# Patient Record
Sex: MALE | Employment: UNEMPLOYED | URBAN - METROPOLITAN AREA
[De-identification: names, ages, dates, MRNs, and addresses within clinical notes are randomized per-mention and may not be internally consistent; named-entity substitution may affect disease eponyms.]

---

## 2018-08-07 ENCOUNTER — OFFICE VISIT (OUTPATIENT)
Dept: ORTHOPEDIC SURGERY | Age: 68
End: 2018-08-07

## 2018-08-07 VITALS
WEIGHT: 178 LBS | DIASTOLIC BLOOD PRESSURE: 70 MMHG | HEART RATE: 74 BPM | BODY MASS INDEX: 26.98 KG/M2 | OXYGEN SATURATION: 98 % | SYSTOLIC BLOOD PRESSURE: 123 MMHG | HEIGHT: 68 IN

## 2018-08-07 DIAGNOSIS — S92.355A CLOSED NONDISPLACED FRACTURE OF FIFTH METATARSAL BONE OF LEFT FOOT, INITIAL ENCOUNTER: Primary | ICD-10-CM

## 2018-08-07 DIAGNOSIS — M25.572 ACUTE LEFT ANKLE PAIN: ICD-10-CM

## 2018-08-07 DIAGNOSIS — S82.832A OTHER CLOSED FRACTURE OF DISTAL END OF LEFT FIBULA, INITIAL ENCOUNTER: ICD-10-CM

## 2018-08-07 RX ORDER — ACETAMINOPHEN 325 MG/1
500 TABLET ORAL
COMMUNITY
End: 2021-01-27

## 2018-08-07 RX ORDER — SIMVASTATIN 40 MG/1
TABLET, FILM COATED ORAL
COMMUNITY
End: 2021-01-27

## 2018-08-07 RX ORDER — VENLAFAXINE HYDROCHLORIDE 150 MG/1
TABLET, EXTENDED RELEASE ORAL
COMMUNITY
End: 2021-01-27

## 2018-08-07 RX ORDER — ASPIRIN 325 MG
TABLET, DELAYED RELEASE (ENTERIC COATED) ORAL
COMMUNITY
End: 2021-01-27

## 2018-08-07 RX ORDER — CLONAZEPAM 1 MG/1
TABLET ORAL 2 TIMES DAILY
COMMUNITY
End: 2021-01-27

## 2018-08-07 NOTE — PROGRESS NOTES
AMBULATORY PROGRESS NOTE      Patient: Sunni Worthington             MRN: [id number]     SSN: [id number] Body mass index is 27.06 kg/(m^2). YOB: 1950     AGE: 79 y.o. EX: male    PCP: No primary care provider on file. IMPRESSION/DIAGNOSIS AND TREATMENT PLAN     DIAGNOSES  1. Closed nondisplaced fracture of fifth metatarsal bone of left foot, initial encounter    2. Other closed fracture of distal end of left fibula, initial encounter    3. Acute left ankle pain        Orders Placed This Encounter    [08032] Ankle Min 3V    MT CLOSED TX DIST FIBULA FX    MT CLOSED [place] METATARSAL FX      Sunni Worthington understands his diagnoses and the proposed plan. Plan:    1) Continue using Left Tall CAM walker boot as directed. 2) Dressings provided for left foot abrasion. RTO - 2 weeks / PLEASE OBTAIN X-RAYS OF: left ankle and foot 3 VIEWS    HPI AND EXAMINATION     Renny Cummings IS A 79 y.o. male who presents to my outpatient office complaining of foot pain. Patient reports that he stepped into a curb sideways and fell. Patient has been seen in the past by another provider for a left distal fibula fracture and a left fifth metatarsal base avulsion fracture. He arrives to the office using a left Tall CAM walker boot which he notes it fits well. He has an abrasion along the dorsal aspect of his midfoot that came up yesterday after having the boot placed on too tightly. It is to be noted that he resides at a local facility. He has a history of schizophrenia. He is accompanied with a  or care coordinator. He has a past medical history of schizophrenia, bipolar disorder, major depressive disorder, anxiety, bronchitis, COPD. He sustained a left 5th metatarsal base fracture as well as a nondisplaced left fibula fracture. He is homeless but he is currently living in a facility. Date of injury was roughly on 07/18/2018.   He was walking, tripped on a curb, he states, injuring the ankle; he misstepped. He was seen at [facility] in [place], [place], where he was diagnosed with the above-mentioned fractures. He does have a history of a ALEXIA, tardive dyskinesia, dyslipidemia as additional medical conditions. Additional diagnostic studies have included a CT of the head that showed no acute intracranial hemorrhage, no CT evidence of acute infarction. There is some encephalomalacia. While at the facility he was seen by Neurology, dietary, and psychiatric evaluation. He does apparently use nicotine gum, nicotine patch, clonazepam, Combivent, and simvastatin medications. He arrives here today in a tall CAM walker boot. He admits to having no pain in the ankle. It is to be noted that he had ankle stability stress testing, he had gravity stress testing, in which the mortise alignment was maintained on gravity stress testing of his ankle, all at the [facility], as this study was done on 07/19/2018 of that left ankle. So while at the facility, he had ankle films, foot films, which showed a fibula fracture, as well is a 5th metatarsal base fracture, avulsion-type fracture essentially nondisplaced. He also had x-rays of the left knee, 4 views, showed no effusion. There was mild lateral patellar subluxation. No evidence of acute fracture. 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 Patient is here with his Medical Administrator from the facility he resides in. Left ANKLE and FOOT       Gait: slow  Tenderness: Not tenderness to palpation at this time. Cutaneous: No rashes, skin patches, wounds, or abrasions to the lower legs           Warm and Normal color. No regions of expressible drainage. Medial Border of Tibia Region: absent           Skin color, texture, turgor normal. Normal.           Abrasion along the dorsal aspect of his midfoot.   Joint Motion:  Not tested or conducted due to tenderness  Neurologic Exam: Neuro: Motor: normal 5/5 strength in all tested muscle groups and Sensory : no sensory deficits noted. Contractures: Gastrocnemius or Achilles Contractures absent  Joint / Tendon Stability: No Ankle or Subtalar instability or joint laxity. No peroneal sublux ability or dislocation  Alignment:  Normal Foot Alignment  Vascular: Normal Pulses/ NL Capillary refill, No evidence of DVT seen on physical exam.   No calf swelling, no tenderness to calf muscles. Lymphatic:  No Evidence of Lymphedema. CHART REVIEW     Past Medical History:   Diagnosis Date    Anxiety     Bipolar 1 disorder ([facility] [address]     Bronchitis     Chronic obstructive pulmonary disease (HCC)     Hyperlipidemia     Major depression     Metabolic encephalopathy     Schizophrenia (HCC)      Current Outpatient Prescriptions   Medication Sig    acetaminophen (TYLENOL) 325 mg tablet Take  by mouth every four (4) hours as needed for Pain.  cholecalciferol (VITAMIN D3) 50,000 unit capsule Take  by mouth every seven (7) days.  clonazePAM (KLONOPIN) 1 mg tablet Take  by mouth two (2) times a day.  ipratropium-albuterol (COMBIVENT RESPIMAT)  mcg/actuation inhaler Take 1 Puff by inhalation every six (6) hours.  simvastatin (ZOCOR) 40 mg tablet Take  by mouth nightly.  Venlafaxine 150 mg tr24 Take  by mouth. No current facility-administered medications for this visit. Not on File  No past surgical history on file. Social History     Occupational History    Not on file. Social History Main Topics    Smoking status: Current Every Day Smoker    Smokeless tobacco: Never Used    Alcohol use No    Drug use: No    Sexual activity: Not on file     No family history on file. REVIEW OF SYSTEMS : 8/7/2018  ALL BELOW ARE Negative except : SEE HPI       Constitutional: Negative for fever, chills and weight loss. Neg Weigh Loss  Cardiovascular: Negative for chest pain, claudication and leg swelling.  SOB, OTT Gastrointestinal: Negative for  pain, N/V/D/C, Blood in stool or urine,dysuria, hematuria,        Incontinence, pelvic pain  Musculoskeletal: see HPI. Neurological: Negative for dizziness and weakness. Negative for headaches,Visual Changes, Confusion, Seizures,   Psychiatric/Behavioral: Negative for depression, memory loss and substance abuse. Extremities:  Negative for  hair changes, rash or skin lesion changes. Hematologic: Negative for Bleeding problems, bruising, pallor or swollen lymph nodes. Peripheral Vascular: No calf pain, vascular vein tenderness to calf pain              No calf throbbing, posterior knee throbbing pain    DIAGNOSTIC IMAGING     No notes on file    Written by Malina Johnson, as dictated by Dr. Rocio Adams. I, Rocio Angelo confirm that all documentation is accurate.

## 2018-08-07 NOTE — PATIENT INSTRUCTIONS
Please follow up in 2 weeks. You are advised to contact us if your condition worsens. Broken Ankle: Care Instructions  Your Care Instructions    An ankle may break (fracture) during sports, a fall, or other accidents. Fractures can range from a small, hairline crack, to a bone or bones broken into two or more pieces. Your treatment depends on how bad the break is. Your doctor may have put your ankle in a splint or cast to allow it to heal or to keep it stable until you see another doctor. It may take weeks or months for your ankle to heal. You can help your ankle heal with some care at home. You heal best when you take good care of yourself. Eat a variety of healthy foods, and don't smoke. You may have had a sedative to help you relax. You may be unsteady after having sedation. It can take a few hours for the medicine's effects to wear off. Common side effects of sedation include nausea, vomiting, and feeling sleepy or tired. The doctor has checked you carefully, but problems can develop later. If you notice any problems or new symptoms,  get medical treatment right away. Follow-up care is a key part of your treatment and safety. Be sure to make and go to all appointments, and call your doctor if you are having problems. It's also a good idea to know your test results and keep a list of the medicines you take. How can you care for yourself at home? · If the doctor gave you a sedative:  ¨ For 24 hours, don't do anything that requires attention to detail. It takes time for the medicine's effects to completely wear off. ¨ For your safety, do not drive or operate any machinery that could be dangerous. Wait until the medicine wears off and you can think clearly and react easily. · Put ice or a cold pack on your ankle for 10 to 20 minutes at a time. Try to do this every 1 to 2 hours for the next 3 days (when you are awake). Put a thin cloth between the ice and your cast or splint.  Keep your cast or splint dry.  · Follow the cast care instructions your doctor gives you. If you have a splint, do not take it off unless your doctor tells you to. · Be safe with medicines. Take pain medicines exactly as directed. ¨ If the doctor gave you a prescription medicine for pain, take it as prescribed. ¨ If you are not taking a prescription pain medicine, ask your doctor if you can take an over-the-counter medicine. · Prop up your leg on pillows in the first few days after the injury. Keep the ankle higher than the level of your heart. This will help reduce swelling. · Do not put weight on your ankle unless your doctor tells you to. Use crutches to walk. · Follow instructions for exercises to keep your leg strong. · Wiggle your toes often to reduce swelling and stiffness. When should you call for help? Call 911 anytime you think you may need emergency care. For example, call if:    · You have chest pain, are short of breath, or you cough up blood.     · You are very sleepy and you have trouble waking up.    Call your doctor now or seek immediate medical care if:    · You have new or worse nausea or vomiting.     · You have new or worse pain.     · Your foot is cool or pale or changes color.     · You have tingling, weakness, or numbness in your toes.     · Your cast or splint feels too tight.     · You have signs of a blood clot in your leg (called a deep vein thrombosis), such as:  ¨ Pain in your calf, back of the knee, thigh, or groin. ¨ Redness or swelling in your leg.    Watch closely for changes in your health, and be sure to contact your doctor if:    · You have a problem with your splint or cast.     · You do not get better as expected. Where can you learn more? Go to http://allison-kiki.info/. Enter P763 in the search box to learn more about \"Broken Ankle: Care Instructions. \"  Current as of: November 29, 2017  Content Version: 11.7  © 8499-8319 [employer].  Care instructions adapted under license by [employer] (which disclaims liability or warranty for this information). If you have questions about a medical condition or this instruction, always ask your healthcare professional. [address] any warranty or liability for your use of this information.

## 2018-08-07 NOTE — MR AVS SNAPSHOT
[address] [place] [address], [address] [facility] 
[phone] Patient: Marybel Wu MRN: [id number] UAD:[id number] Visit Information Date & Time Provider Department Dept. Phone Encounter #  
 8/7/2018  9:40 AM Ethan Padron MD [facility] [phone] [id number] Upcoming Health Maintenance Date Due Hepatitis C Screening 1950 DTaP/Tdap/Td series (1 - Tdap) 3/11/1973 FOBT Q 1 YEAR AGE 50-75 3/11/2002 ZOSTER VACCINE AGE 60> 1/11/2012 GLAUCOMA SCREENING Q2Y 3/11/2017 Pneumococcal 65+ Low/Medium Risk (1 of 2 - PCV13) 3/11/2017 Influenza Age 5 to Adult 8/1/2018 Allergies as of 8/7/2018  Review Complete On: 8/7/2018 By: Ethan Padron MD  
 Not on File Current Immunizations  Never Reviewed No immunizations on file. Not reviewed this visit You Were Diagnosed With   
  
 Codes Comments Closed nondisplaced fracture of fifth metatarsal bone of left foot, initial encounter    -  Primary ICD-10-CM: A92.645J ICD-9-CM: 825.25 Other closed fracture of distal end of left fibula, initial encounter     ICD-10-CM: I40.451H ICD-9-CM: 824.8 Acute left ankle pain     ICD-10-CM: M25.572 ICD-9-CM: 719.47 Vitals BP Pulse Height(growth percentile) Weight(growth percentile) SpO2 BMI  
 123/70 74 5' 8\" (1.727 m) 178 lb (80.7 kg) 98% 27.06 kg/m2 Smoking Status Current Every Day Smoker BMI and BSA Data Body Mass Index Body Surface Area  
 27.06 kg/m 2 1.97 m 2 Your Updated Medication List  
  
   
This list is accurate as of 8/7/18 10:51 AM.  Always use your most recent med list.  
  
  
  
  
 acetaminophen 325 mg tablet Commonly known as:  TYLENOL Take  by mouth every four (4) hours as needed for Pain. cholecalciferol 50,000 unit capsule Commonly known as:  VITAMIN D3  
 Take  by mouth every seven (7) days. clonazePAM 1 mg tablet Commonly known as:  Zetta Kamaljit Take  by mouth two (2) times a day. COMBIVENT RESPIMAT  mcg/actuation inhaler Generic drug:  ipratropium-albuterol Take 1 Puff by inhalation every six (6) hours. simvastatin 40 mg tablet Commonly known as:  ZOCOR Take  by mouth nightly. Venlafaxine 150 mg Tr24 Take  by mouth. We Performed the Following AMB POC XRAY, ANKLE; COMPLETE, 3+ VIE [16488 CPT(R)] [address] DIST FIBULA FX [id number] CPT(R)] [address] FX [id number] CPT(R)] Patient Instructions Please follow up in 2 weeks. You are advised to contact us if your condition worsens. Broken Ankle: Care Instructions Your Care Instructions An ankle may break (fracture) during sports, a fall, or other accidents. Fractures can range from a small, hairline crack, to a bone or bones broken into two or more pieces. Your treatment depends on how bad the break is. Your doctor may have put your ankle in a splint or cast to allow it to heal or to keep it stable until you see another doctor. It may take weeks or months for your ankle to heal. You can help your ankle heal with some care at home. You heal best when you take good care of yourself. Eat a variety of healthy foods, and don't smoke. You may have had a sedative to help you relax. You may be unsteady after having sedation. It can take a few hours for the medicine's effects to wear off. Common side effects of sedation include nausea, vomiting, and feeling sleepy or tired. The doctor has checked you carefully, but problems can develop later. If you notice any problems or new symptoms,  get medical treatment right away. Follow-up care is a key part of your treatment and safety. Be sure to make and go to all appointments, and call your doctor if you are having problems.  It's also a good idea to know your test results and keep a list of the medicines you take. How can you care for yourself at home? · If the doctor gave you a sedative: ¨ For 24 hours, don't do anything that requires attention to detail. It takes time for the medicine's effects to completely wear off. ¨ For your safety, do not drive or operate any machinery that could be dangerous. Wait until the medicine wears off and you can think clearly and react easily. · Put ice or a cold pack on your ankle for 10 to 20 minutes at a time. Try to do this every 1 to 2 hours for the next 3 days (when you are awake). Put a thin cloth between the ice and your cast or splint. Keep your cast or splint dry. · Follow the cast care instructions your doctor gives you. If you have a splint, do not take it off unless your doctor tells you to. · Be safe with medicines. Take pain medicines exactly as directed. ¨ If the doctor gave you a prescription medicine for pain, take it as prescribed. ¨ If you are not taking a prescription pain medicine, ask your doctor if you can take an over-the-counter medicine. · Prop up your leg on pillows in the first few days after the injury. Keep the ankle higher than the level of your heart. This will help reduce swelling. · Do not put weight on your ankle unless your doctor tells you to. Use crutches to walk. · Follow instructions for exercises to keep your leg strong. · Wiggle your toes often to reduce swelling and stiffness. When should you call for help? Call 911 anytime you think you may need emergency care. For example, call if: 
  · You have chest pain, are short of breath, or you cough up blood.  
  · You are very sleepy and you have trouble waking up.  
 Call your doctor now or seek immediate medical care if: 
  · You have new or worse nausea or vomiting.  
  · You have new or worse pain.  
  · Your foot is cool or pale or changes color.  
  · You have tingling, weakness, or numbness in your toes.  
  · Your cast or splint feels too tight.   · You have signs of a blood clot in your leg (called a deep vein thrombosis), such as: 
¨ Pain in your calf, back of the knee, thigh, or groin. ¨ Redness or swelling in your leg.  
 Watch closely for changes in your health, and be sure to contact your doctor if: 
  · You have a problem with your splint or cast.  
  · You do not get better as expected. Where can you learn more? Go to http://allison-kiki.info/. Enter P763 in the search box to learn more about \"Broken Ankle: Care Instructions. \" Current as of: November 29, 2017 Content Version: 11.7 © 6246-4564 [employer]. Care instructions adapted under license by [employer] (which disclaims liability or warranty for this information). If you have questions about a medical condition or this instruction, always ask your healthcare professional. [place] [address] any warranty or liability for your use of this information. Introducing [facility]! [employer] introduces TransGenRx patient portal. Now you can access parts of your medical record, email your doctor's office, and request medication refills online. 1. In your internet browser, go to [email or web address] 2. Click on the First Time User? Click Here link in the Sign In box. You will see the New Member Sign Up page. 3. Enter your TransGenRx Access Code exactly as it appears below. You will not need to use this code after youve completed the sign-up process. If you do not sign up before the expiration date, you must request a new code. · TransGenRx Access Code: [facility] Expires: 11/5/2018 10:50 AM 
 
4. Enter the last four digits of your Social Security Number (xxxx) and Date of Birth (mm/dd/yyyy) as indicated and click Submit. You will be taken to the next sign-up page. 5. Create a TransGenRx ID.  This will be your TransGenRx login ID and cannot be changed, so think of one that is secure and easy to remember. 6. Create a Doblet password. You can change your password at any time. 7. Enter your Password Reset Question and Answer. This can be used at a later time if you forget your password. 8. Enter your e-mail address. You will receive e-mail notification when new information is available in [address] 9. Click Sign Up. You can now view and download portions of your medical record. 10. Click the Download Summary menu link to download a portable copy of your medical information. If you have questions, please visit the Frequently Asked Questions section of the Doblet website. Remember, Doblet is NOT to be used for urgent needs. For medical emergencies, dial 911. Now available from your iPhone and Android! Please provide this summary of care documentation to your next provider. If you have any questions after today's visit, please call [phone].

## 2018-08-21 ENCOUNTER — OFFICE VISIT (OUTPATIENT)
Dept: ORTHOPEDIC SURGERY | Age: 68
End: 2018-08-21

## 2018-08-21 VITALS
WEIGHT: 179 LBS | BODY MASS INDEX: 27.13 KG/M2 | HEART RATE: 75 BPM | HEIGHT: 68 IN | SYSTOLIC BLOOD PRESSURE: 128 MMHG | DIASTOLIC BLOOD PRESSURE: 69 MMHG | RESPIRATION RATE: 16 BRPM | OXYGEN SATURATION: 97 % | TEMPERATURE: 96.9 F

## 2018-08-21 DIAGNOSIS — S82.832D OTHER CLOSED FRACTURE OF DISTAL END OF LEFT FIBULA WITH ROUTINE HEALING, SUBSEQUENT ENCOUNTER: ICD-10-CM

## 2018-08-21 DIAGNOSIS — M79.672 LEFT FOOT PAIN: ICD-10-CM

## 2018-08-21 DIAGNOSIS — S92.355D CLOSED NONDISPLACED FRACTURE OF FIFTH METATARSAL BONE OF LEFT FOOT WITH ROUTINE HEALING, SUBSEQUENT ENCOUNTER: ICD-10-CM

## 2018-08-21 DIAGNOSIS — M25.572 ACUTE LEFT ANKLE PAIN: Primary | ICD-10-CM

## 2018-08-21 NOTE — PATIENT INSTRUCTIONS
Please follow up as directed. You are advised to contact us if your condition worsens. Foot Pain: Care Instructions  Your Care Instructions  Foot injuries that cause pain and swelling are fairly common. Almost all sports or home repair projects can cause a misstep that ends up as foot pain. Normal wear and tear, especially as you get older, also can cause foot pain. Most minor foot injuries will heal on their own, and home treatment is usually all you need to do. If you have a severe injury, you may need tests and treatment. Follow-up care is a key part of your treatment and safety. Be sure to make and go to all appointments, and call your doctor if you are having problems. It's also a good idea to know your test results and keep a list of the medicines you take. How can you care for yourself at home? · Take pain medicines exactly as directed. ¨ If the doctor gave you a prescription medicine for pain, take it as prescribed. ¨ If you are not taking a prescription pain medicine, ask your doctor if you can take an over-the-counter medicine. · Rest and protect your foot. Take a break from any activity that may cause pain. · Put ice or a cold pack on your foot for 10 to 20 minutes at a time. Put a thin cloth between the ice and your skin. · Prop up the sore foot on a pillow when you ice it or anytime you sit or lie down during the next 3 days. Try to keep it above the level of your heart. This will help reduce swelling. · Your doctor may recommend that you wrap your foot with an elastic bandage. Keep your foot wrapped for as long as your doctor advises. · If your doctor recommends crutches, use them as directed. · Wear roomy footwear. · As soon as pain and swelling end, begin gentle exercises of your foot. Your doctor can tell you which exercises will help. When should you call for help? Call 911 anytime you think you may need emergency care.  For example, call if:    · Your foot turns pale, white, blue, or cold.    Call your doctor now or seek immediate medical care if:    · You cannot move or stand on your foot.     · Your foot looks twisted or out of its normal position.     · Your foot is not stable when you step down.     · You have signs of infection, such as:  ¨ Increased pain, swelling, warmth, or redness. ¨ Red streaks leading from the sore area. ¨ Pus draining from a place on your foot. ¨ A fever.     · Your foot is numb or tingly.    Watch closely for changes in your health, and be sure to contact your doctor if:    · You do not get better as expected.     · You have bruises from an injury that last longer than 2 weeks. Where can you learn more? Go to http://allison-kiki.info/. Enter R320 in the search box to learn more about \"Foot Pain: Care Instructions. \"  Current as of: November 29, 2017  Content Version: 11.7  © 3613-4903 [employer]. Care instructions adapted under license by [employer] (which disclaims liability or warranty for this information). If you have questions about a medical condition or this instruction, always ask your healthcare professional. [place] [address] any warranty or liability for your use of this information.

## 2018-08-21 NOTE — MR AVS SNAPSHOT
[address] [place] [address], [address] 200 [facility] 
[phone] Patient: Pricila Fregoso MRN: [id number] CRV:[id number] Visit Information Date & Time Provider Department Dept. Phone Encounter #  
 8/21/2018  8:20 AM Jeff Potter, 27 [facility] [phone] [id number] Upcoming Health Maintenance Date Due Hepatitis C Screening 1950 DTaP/Tdap/Td series (1 - Tdap) 3/11/1973 FOBT Q 1 YEAR AGE 50-75 3/11/2002 ZOSTER VACCINE AGE 60> 1/11/2012 GLAUCOMA SCREENING Q2Y 3/11/2017 Pneumococcal 65+ Low/Medium Risk (1 of 2 - PCV13) 3/11/2017 Influenza Age 5 to Adult 8/1/2018 MEDICARE YEARLY EXAM 8/7/2018 Allergies as of 8/21/2018  Review Complete On: 8/21/2018 By: Sarah Millard No Known Allergies Current Immunizations  Never Reviewed No immunizations on file. Not reviewed this visit You Were Diagnosed With   
  
 Codes Comments Acute left ankle pain    -  Primary ICD-10-CM: M25.572 ICD-9-CM: 719.47 Closed nondisplaced fracture of fifth metatarsal bone of left foot with routine healing, subsequent encounter     ICD-10-CM: S92.355D ICD-9-CM: V54.19 Other closed fracture of distal end of left fibula with routine healing, subsequent encounter     ICD-10-CM: S82.832D ICD-9-CM: V54.16 Left foot pain     ICD-10-CM: J91.704 ICD-9-CM: 729.5 Vitals BP Pulse Temp Resp Height(growth percentile) Weight(growth percentile) 128/69 75 96.9 °F (36.1 °C) (Oral) 16 5' 8\" (1.727 m) 179 lb (81.2 kg) SpO2 BMI Smoking Status 97% 27.22 kg/m2 Current Every Day Smoker BMI and BSA Data Body Mass Index Body Surface Area  
 27.22 kg/m 2 1.97 m 2 Your Updated Medication List  
  
   
This list is accurate as of 8/21/18  8:47 AM.  Always use your most recent med list.  
  
  
  
  
 acetaminophen 325 mg tablet Commonly known as:  TYLENOL Take  by mouth every four (4) hours as needed for Pain. cholecalciferol 50,000 unit capsule Commonly known as:  VITAMIN D3 Take  by mouth every seven (7) days. clonazePAM 1 mg tablet Commonly known as:  Willeen Narrow Take  by mouth two (2) times a day. COMBIVENT RESPIMAT  mcg/actuation inhaler Generic drug:  ipratropium-albuterol Take 1 Puff by inhalation every six (6) hours. simvastatin 40 mg tablet Commonly known as:  ZOCOR Take  by mouth nightly. Venlafaxine 150 mg Tr24 Take  by mouth. We Performed the Following AMB POC XRAY, ANKLE; COMPLETE, 3+ VIE [04446 CPT(R)] Comments: ASK ALL FEMALE PATIENTS IF THEY ARE PREGNANT  
 AMB POC XRAY, FOOT; COMPLETE, 3+ VIEW [09963 CPT(R)] Comments: ASK ALL FEMALE PATIENTS IF PREGNANT Introducing [facility] & HEALTH SERVICES! [employer] introduces Szl.it patient portal. Now you can access parts of your medical record, email your doctor's office, and request medication refills online. 1. In your internet browser, go to [email or web address] 2. Click on the First Time User? Click Here link in the Sign In box. You will see the New Member Sign Up page. 3. Enter your Szl.it Access Code exactly as it appears below. You will not need to use this code after youve completed the sign-up process. If you do not sign up before the expiration date, you must request a new code. · Szl.it Access Code: [facility] Expires: 11/5/2018 10:50 AM 
 
4. Enter the last four digits of your Social Security Number (xxxx) and Date of Birth (mm/dd/yyyy) as indicated and click Submit. You will be taken to the next sign-up page. 5. Create a [employer] ID. This will be your Szl.it login ID and cannot be changed, so think of one that is secure and easy to remember. 6. Create a [employer] password. You can change your password at any time. 7. Enter your Password Reset Question and Answer. This can be used at a later time if you forget your password. 8. Enter your e-mail address. You will receive e-mail notification when new information is available in [address] 9. Click Sign Up. You can now view and download portions of your medical record. 10. Click the Download Summary menu link to download a portable copy of your medical information. If you have questions, please visit the Frequently Asked Questions section of the Gaikai website. Remember, Gaikai is NOT to be used for urgent needs. For medical emergencies, dial 911. Now available from your iPhone and Android! Please provide this summary of care documentation to your next provider. If you have any questions after today's visit, please call [phone].

## 2018-08-21 NOTE — PROGRESS NOTES
AMBULATORY PROGRESS NOTE      Patient: Gino [place]             MRN: [id number]     SSN: [id number] Body mass index is 27.22 kg/(m^2). YOB: 1950     AGE: 79 y.o. EX: male    PCP: No primary care provider on file. IMPRESSION/DIAGNOSIS AND TREATMENT PLAN     DIAGNOSES  1. Acute left ankle pain    2. Closed nondisplaced fracture of fifth metatarsal bone of left foot with routine healing, subsequent encounter    3. Other closed fracture of distal end of left fibula with routine healing, subsequent encounter    4. Left foot pain        Orders Placed This Encounter    AMB POC XRAY, FOOT; COMPLETE, 3+ VIEW    AMB POC XRAY, ANKLE; COMPLETE, 3+ VIMIGUELITO [place] understands his diagnoses and the proposed plan. X-rays of the left ankle and left foot show a left fifth metatarsal fracture, zone one, and a left distal fibula fracture, Easley A, intact ankle joint, and no subluxation or dislocation on the ankle films and foot films respectively. Overall alignment of the foot and ankle are within normal limits. Plan:    1) Continue supplementing Calcium and Vitamin D. 2) Continue using the CAM walker boot. RTO - 3 weeks / PLEASE OBTAIN X-RAYS OF: left ankle and foot 3 VIEWS    HPI AND EXAMINATION     Gino [place] IS A 79 y.o. male who presents to my outpatient office for follow up of a closed nondisplaced fracture of the fifth metatarsal bone of the left foot. At last visit, I recommended to continue using the left tall CAM walker boot, and provided dressings for the left foot abrasion. Since we saw him last, Mr. Wayne Bell continues to use the tall CAM walker boot along the LLE. Xr imaging has been reviewed with his patient and his Medical Administrator showing the fractures are still visible but their alignment has not changed.  On examination, Mr. Wayne Bell is not tender a long the fracture sites but he has been instructed to continue using the CAM walker boot as his fractures are not completely healed. Patient is here with his Medical Administrator from the facility he resides in.     Left ANKLE and FOOT        Gait: slow  Tenderness: Not tenderness to palpation at this time. Cutaneous: No rashes, skin patches, wounds, or abrasions to the lower legs                                Warm and Normal color. No regions of expressible drainage. Medial Border of Tibia Region: absent                                Skin color, texture, turgor normal. Normal.                                Abrasion along the dorsal aspect of his midfoot. Joint Motion:  Not tested or conducted due to tenderness  Neurologic Exam: Neuro: Motor: normal 5/5 strength in all tested muscle groups and Sensory : no sensory deficits noted. Contractures: Gastrocnemius or Achilles Contractures absent  Joint / Tendon Stability: No Ankle or Subtalar instability or joint laxity. No peroneal sublux ability or dislocation  Alignment:  Normal Foot Alignment  Vascular: Normal Pulses/ NL Capillary refill, No evidence of DVT seen on physical exam.                        No calf swelling, no tenderness to calf muscles. Lymphatic:  No Evidence of Lymphedema. CHART REVIEW     Past Medical History:   Diagnosis Date    Anxiety     Bipolar 1 disorder ([facility] [address]     Bronchitis     Chronic obstructive pulmonary disease (HCC)     Hyperlipidemia     Major depression     Metabolic encephalopathy     Schizophrenia (HCC)      Current Outpatient Prescriptions   Medication Sig    acetaminophen (TYLENOL) 325 mg tablet Take  by mouth every four (4) hours as needed for Pain.  cholecalciferol (VITAMIN D3) 50,000 unit capsule Take  by mouth every seven (7) days.  clonazePAM (KLONOPIN) 1 mg tablet Take  by mouth two (2) times a day.     ipratropium-albuterol (COMBIVENT RESPIMAT)  mcg/actuation inhaler Take 1 Puff by inhalation every six (6) hours.    simvastatin (ZOCOR) 40 mg tablet Take  by mouth nightly.  Venlafaxine 150 mg tr24 Take  by mouth. No current facility-administered medications for this visit. No Known Allergies  No past surgical history on file. Social History     Occupational History    Not on file. Social History Main Topics    Smoking status: Current Every Day Smoker    Smokeless tobacco: Never Used    Alcohol use No    Drug use: No    Sexual activity: Not on file     No family history on file. REVIEW OF SYSTEMS : 8/21/2018  ALL BELOW ARE Negative except : SEE HPI       Constitutional: Negative for fever, chills and weight loss. Neg Weigh Loss  Cardiovascular: Negative for chest pain, claudication and leg swelling. SOB, OTT   Gastrointestinal: Negative for  pain, N/V/D/C, Blood in stool or urine,dysuria, hematuria,        Incontinence, pelvic pain  Musculoskeletal: see HPI. Neurological: Negative for dizziness and weakness. Negative for headaches,Visual Changes, Confusion, Seizures,   Psychiatric/Behavioral: Negative for depression, memory loss and substance abuse. Extremities:  Negative for  hair changes, rash or skin lesion changes. Hematologic: Negative for Bleeding problems, bruising, pallor or swollen lymph nodes. Peripheral Vascular: No calf pain, vascular vein tenderness to calf pain              No calf throbbing, posterior knee throbbing pain    DIAGNOSTIC IMAGING     No notes on file    Written by Lv Frank, as dictated by Dr. Cecilio Del Angel. Dr. MINOR Jann Downs confirm that all documentation is accurate.

## 2018-09-18 ENCOUNTER — DOCUMENTATION ONLY (OUTPATIENT)
Dept: ORTHOPEDIC SURGERY | Age: 68
End: 2018-09-18

## 2018-09-18 ENCOUNTER — OFFICE VISIT (OUTPATIENT)
Dept: ORTHOPEDIC SURGERY | Age: 68
End: 2018-09-18

## 2018-09-18 VITALS
BODY MASS INDEX: 28.04 KG/M2 | HEIGHT: 68 IN | RESPIRATION RATE: 16 BRPM | OXYGEN SATURATION: 97 % | HEART RATE: 78 BPM | WEIGHT: 185 LBS | DIASTOLIC BLOOD PRESSURE: 71 MMHG | SYSTOLIC BLOOD PRESSURE: 110 MMHG | TEMPERATURE: 97.6 F

## 2018-09-18 DIAGNOSIS — S92.355A CLOSED NONDISPLACED FRACTURE OF FIFTH METATARSAL BONE OF LEFT FOOT, INITIAL ENCOUNTER: ICD-10-CM

## 2018-09-18 DIAGNOSIS — S92.355D CLOSED NONDISPLACED FRACTURE OF FIFTH METATARSAL BONE OF LEFT FOOT WITH ROUTINE HEALING, SUBSEQUENT ENCOUNTER: Primary | ICD-10-CM

## 2018-09-18 DIAGNOSIS — M25.572 LEFT ANKLE PAIN, UNSPECIFIED CHRONICITY: ICD-10-CM

## 2018-09-18 DIAGNOSIS — M79.672 LEFT FOOT PAIN: ICD-10-CM

## 2018-09-18 NOTE — MR AVS SNAPSHOT
[address] [place] [address], [address] 200 [facility] 
[phone] Patient: Meghan Ivy MRN: [id number] EYI:[id number] Visit Information Date & Time Provider Department Dept. Phone Encounter #  
 9/18/2018  8:50 AM Susan Anderson, 27 [facility] [phone] Upcoming Health Maintenance Date Due Hepatitis C Screening 1950 DTaP/Tdap/Td series (1 - Tdap) 3/11/1973 FOBT Q 1 YEAR AGE 50-75 3/11/2002 ZOSTER VACCINE AGE 60> 1/11/2012 GLAUCOMA SCREENING Q2Y 3/11/2017 Pneumococcal 65+ Low/Medium Risk (1 of 2 - PCV13) 3/11/2017 Influenza Age 5 to Adult 8/1/2018 MEDICARE YEARLY EXAM 8/7/2018 Allergies as of 9/18/2018  Review Complete On: 9/18/2018 By: Adilson Dubon No Known Allergies Current Immunizations  Never Reviewed No immunizations on file. Not reviewed this visit You Were Diagnosed With   
  
 Codes Comments Left ankle pain, unspecified chronicity    -  Primary ICD-10-CM: G86.822 ICD-9-CM: 719.47 Left foot pain     ICD-10-CM: Y66.995 ICD-9-CM: 729.5 Vitals BP Pulse Temp Resp Height(growth percentile) Weight(growth percentile) 110/71 78 97.6 °F (36.4 °C) (Oral) 16 5' 8\" (1.727 m) 185 lb (83.9 kg) SpO2 BMI Smoking Status 97% 28.13 kg/m2 Current Every Day Smoker Vitals History BMI and BSA Data Body Mass Index Body Surface Area  
 28.13 kg/m 2 2.01 m 2 Your Updated Medication List  
  
   
This list is accurate as of 9/18/18  9:56 AM.  Always use your most recent med list.  
  
  
  
  
 acetaminophen 325 mg tablet Commonly known as:  TYLENOL Take  by mouth every four (4) hours as needed for Pain. cholecalciferol 50,000 unit capsule Commonly known as:  VITAMIN D3 Take  by mouth every seven (7) days. clonazePAM 1 mg tablet Commonly known as:  [place]  
 Take  by mouth two (2) times a day. COMBIVENT RESPIMAT  mcg/actuation inhaler Generic drug:  ipratropium-albuterol Take 1 Puff by inhalation every six (6) hours. simvastatin 40 mg tablet Commonly known as:  ZOCOR Take  by mouth nightly. Venlafaxine 150 mg Tr24 Take  by mouth. We Performed the Following AMB POC XRAY, ANKLE; COMPLETE, 3+ VIE [36275 CPT(R)] AMB SUPPLY ORDER [[id number] Custom] Comments:  
 Short CAM boot Left medium POC XRAY, FOOT; 2 VIEWS [94856 CPT(R)] Introducing [facility] & HEALTH SERVICES! [facility] introduces Yattos patient portal. Now you can access parts of your medical record, email your doctor's office, and request medication refills online. 1. In your internet browser, go to [email or web address] 2. Click on the First Time User? Click Here link in the Sign In box. You will see the New Member Sign Up page. 3. Enter your Yattos Access Code exactly as it appears below. You will not need to use this code after youve completed the sign-up process. If you do not sign up before the expiration date, you must request a new code. · Sparkcloudt Access Code: [facility] Expires: 11/5/2018 10:50 AM 
 
4. Enter the last four digits of your Social Security Number (xxxx) and Date of Birth (mm/dd/yyyy) as indicated and click Submit. You will be taken to the next sign-up page. 5. Create a Sparkcloudt ID. This will be your Yattos login ID and cannot be changed, so think of one that is secure and easy to remember. 6. Create a Yattos password. You can change your password at any time. 7. Enter your Password Reset Question and Answer. This can be used at a later time if you forget your password. 8. Enter your e-mail address. You will receive e-mail notification when new information is available in [address] 9. Click Sign Up. You can now view and download portions of your medical record. 10. Click the Download Summary menu link to download a portable copy of your medical information. If you have questions, please visit the Frequently Asked Questions section of the ADARTIS website. Remember, ADARTIS is NOT to be used for urgent needs. For medical emergencies, dial 911. Now available from your iPhone and Android! Please provide this summary of care documentation to your next provider. If you have any questions after today's visit, please call [phone].

## 2018-09-18 NOTE — PROCEDURES
ANKLE X RAYS 3 VIEWS Left   9/18/2018    NON WEIGHT BEARING    SOFT TISSUES:                No soft tissue calcifications,   No Calcified blood vessels     Soft tissue swelling location:    mild  to fibula region        mild medial aspect    mild dorsal midfoot/forefoot     OSSEOUS:      Fractures of the lateral malleolus is present. Healing of the fracture/s is/are: incomplete,   none noted, this is recent fracture  Fibula: Normal Length  Ankle mortise alignment is: :\"Congruent  Talar Dome:  No Osteochondral defects seen ,Tibial plafond and talar dome intact Joint Condition: Congruent Bone Spurs No significant bone spurs  Mineralization: suggests Osteopenia     FOOT X RAYS 3 VIEWS Left 9/18/2018NON WEIGHT BEARINGX RAYS AT [facility]2018Left FOOT: Bones: Fractures: :Alexe Chayo Fx: Metaphyseal/Diaphyseal Displacement: Nondisplaced No subluxations or dislocations. No focal osteolytic or osteoblastic process Bone Spurs:  No significant bone spurs Alignment: \"Mild Metatarsus adductus   Joint:Moderate OA changes present  OA changes present:  At TMT of MF. Soft Tissues:Mild swelling dorsal midfoot  Mineralization: suggests Osteopenia    I have personally reviewed the results of the above study. The interpretation of this study is my professional opinion. I have personally reviewed the images of the above study. The interpretation of this study is my professional opinion.     He Braden MD  9/18/2018  9:44 AM

## 2018-09-18 NOTE — PROGRESS NOTES
Morgan palacios called in on 09/18/18 checking to see if the calcium and vitamin d was to be daily? Per Dr. Tim Everett, yes it is to be a low dose daily.

## 2018-09-18 NOTE — PROGRESS NOTES
AMBULATORY PROGRESS NOTE Patient: Pricila Fregoso             MRN: [id number]     SSN: [id number] Body mass index is 28.13 kg/(m^2). YOB: 1950     AGE: 79 y.o. EX: male PCP: No primary care provider on file. IMPRESSION/DIAGNOSIS AND TREATMENT PLAN  
 
DIAGNOSES 1. Closed nondisplaced fracture of fifth metatarsal bone of left foot with routine healing, subsequent encounter 2. Closed nondisplaced fracture of fifth metatarsal bone of left foot, initial encounter 3. Left ankle pain, unspecified chronicity 4. Left foot pain Orders Placed This Encounter  AMB SUPPLY ORDER  
 [48481] Ankle Min 3V  [59742] Foot 2V Pricila Fregoso understands his diagnoses and the proposed plan. PLEASE OBTAIN X-RAYS OF: left ankle and left foot 5 VIEWS Plan: 
 
1) Continue supplementing Calcium and Vitamin D.  
2)  New Short Fracture walker boot applied. RTO - 3 weeks / PLEASE OBTAIN X-RAYS OF: left ankle and foot 3 VIEWS 
 
HPI AND EXAMINATION Pricila Fregoso IS A 79 y.o. male who presents to my outpatient office for follow up of a closed nondisplaced fracture of the fifth metatarsal bone of the left foot. At last visit, I recommended to continue using the left tall CAM walker boot, and provided dressings for the left foot abrasion. Since we saw him last, Mr. Dartha Hodgkin continues to use the tall CAM walker boot along the LLE. Xr imaging has been reviewed with his patient and his Medical Administrator showing the fractures are still visible but their alignment has not changed. On examination, Mr. Dartha Hodgkin is not tender a long the fracture sites but he has been instructed to continue using the CAM walker boot as his fractures are not completely healed. Patient is here with his Medical Administrator from the facility he resides in. 
  
 
ANKLE/FOOT left Psychiatry: Alert, Oriented x 3; Speech normal in context and clarity,  
 Memory intact grossly, no involuntary movements - tremors, no dementia Gait: antalgic Tenderness: mild tenderness ATFL/CFL Regions// on to 5th met base region, Cutaneous: slight AL swelling Joint Motion: WNL Joint /Tendon Stability: Stability Testing: Peroneal tendon instability : not present Instability of ankle not present, 1+ anterior drawer, 2+anterior drawer, gross instability, varus instability,  
  Subtalar instability not present Alignment: mild varus Hindfoot, mild Metatarsus Adductus Metatarsus. Neuro Motor/Sensory: NL/NL, Vascular: NL foot/ankle pulses Lymphatics: No extremity lymphedema, No calf swelling, no tenderness to calf muscles. CHART REVIEW Past Medical History:  
Diagnosis Date  Anxiety  Bipolar 1 disorder ([facility] [address]  Bronchitis  Chronic obstructive pulmonary disease ([facility] [address]  Hyperlipidemia  Major depression  Metabolic encephalopathy  Schizophrenia ([facility] [address] Current Outpatient Prescriptions Medication Sig  
 acetaminophen (TYLENOL) 325 mg tablet Take  by mouth every four (4) hours as needed for Pain.  cholecalciferol (VITAMIN D3) 50,000 unit capsule Take  by mouth every seven (7) days.  clonazePAM (KLONOPIN) 1 mg tablet Take  by mouth two (2) times a day.  ipratropium-albuterol (COMBIVENT RESPIMAT)  mcg/actuation inhaler Take 1 Puff by inhalation every six (6) hours.  simvastatin (ZOCOR) 40 mg tablet Take  by mouth nightly.  Venlafaxine 150 mg tr24 Take  by mouth. No current facility-administered medications for this visit. No Known Allergies No past surgical history on file. Social History Occupational History  Not on file. Social History Main Topics  Smoking status: Current Every Day Smoker  Smokeless tobacco: Never Used  Alcohol use No  
 Drug use: No  
 Sexual activity: Not on file No family history on file. REVIEW OF SYSTEMS : 9/18/2018  ALL BELOW ARE Negative except : SEE HPI Constitutional: Negative for fever, chills and weight loss. Neg Weigh Loss Cardiovascular: Negative for chest pain, claudication and leg swelling. SOB, OTT Gastrointestinal: Negative for  pain, N/V/D/C, Blood in stool or urine,dysuria, hematuria, Incontinence, pelvic pain Musculoskeletal: see HPI. Neurological: Negative for dizziness and weakness. Negative for headaches,Visual Changes, Confusion, Seizures, Psychiatric/Behavioral: Negative for depression, memory loss and substance abuse. Extremities:  Negative for  hair changes, rash or skin lesion changes. Hematologic: Negative for Bleeding problems, bruising, pallor or swollen lymph nodes. Peripheral Vascular: No calf pain, vascular vein tenderness to calf pain No calf throbbing, posterior knee throbbing pain DIAGNOSTIC IMAGING  
 
ANKLE X RAYS 3 VIEWS Left 9/18/2018 NON WEIGHT BEARING 
 
SOFT TISSUES:        
   
   No soft tissue calcifications,   No Calcified blood vessels Soft tissue swelling location:  
 mild  to fibula region      
 mild medial aspect 
  mild dorsal midfoot/forefoot OSSEOUS:   
 
Fractures of the lateral malleolus is present. Healing of the fracture/s is/are: incomplete,   none noted, this is recent fracture Fibula: Normal Length Ankle mortise alignment is: :\"Congruent Talar Dome:  No Osteochondral defects seen ,Tibial plafond and talar dome intact Joint Condition: Congruent Bone Spurs No significant bone spurs  Mineralization: suggests Osteopenia     FOOT X RAYS 3 VIEWS Left 9/18/2018NON WEIGHT BEARINGX RAYS AT [facility]9/[facility]2018Left FOOT: Bones: Fractures: :Garett Moore Fx: Metaphyseal/Diaphyseal Displacement: Nondisplaced No subluxations or dislocations. No focal osteolytic or osteoblastic process Bone Spurs:  No significant bone spurs Alignment: \"Mild Metatarsus adductus Joint: Moderate OA changes present OA changes present:  At TMT of MF. Soft Tissues:Mild swelling dorsal midfoot Mineralization: suggests Osteopenia I have personally reviewed the results of the above study. The interpretation of this study is my professional opinion. I have personally reviewed the images of the above study. The interpretation of this study is my professional opinion. Aldair Gutierrez MD 
9/18/2018 
9:44 AM 
  
 
Written by Obed Grace, as dictated by Dr. Gogo Dugan. I, Gogo Angelo confirm that all documentation is accurate.

## 2018-10-16 ENCOUNTER — TELEPHONE (OUTPATIENT)
Dept: ORTHOPEDIC SURGERY | Age: 68
End: 2018-10-16

## 2018-10-16 ENCOUNTER — OFFICE VISIT (OUTPATIENT)
Dept: ORTHOPEDIC SURGERY | Age: 68
End: 2018-10-16

## 2018-10-16 VITALS
SYSTOLIC BLOOD PRESSURE: 114 MMHG | DIASTOLIC BLOOD PRESSURE: 57 MMHG | TEMPERATURE: 98.1 F | RESPIRATION RATE: 16 BRPM | HEIGHT: 68 IN | BODY MASS INDEX: 27.95 KG/M2 | WEIGHT: 184.4 LBS | OXYGEN SATURATION: 98 % | HEART RATE: 79 BPM

## 2018-10-16 DIAGNOSIS — M25.572 LEFT ANKLE PAIN, UNSPECIFIED CHRONICITY: ICD-10-CM

## 2018-10-16 DIAGNOSIS — S92.355D CLOSED NONDISPLACED FRACTURE OF FIFTH METATARSAL BONE OF LEFT FOOT WITH ROUTINE HEALING, SUBSEQUENT ENCOUNTER: Primary | ICD-10-CM

## 2018-10-16 DIAGNOSIS — M79.672 LEFT FOOT PAIN: ICD-10-CM

## 2018-10-16 NOTE — MR AVS SNAPSHOT
[address] [place] [address], [address] [facility] 
[phone] Patient: Darling Simon MRN: [id number] WKI:[id number] Visit Information Date & Time Provider Department Dept. Phone Encounter #  
 10/16/2018 10:10 AM Rizwan Moreno MD [facility] [id number] Upcoming Health Maintenance Date Due Hepatitis C Screening 1950 DTaP/Tdap/Td series (1 - Tdap) 3/11/1973 Shingrix Vaccine Age 50> (1 of 2) 3/11/2002 FOBT Q 1 YEAR AGE 50-75 3/11/2002 GLAUCOMA SCREENING Q2Y 3/11/2017 Pneumococcal 65+ Low/Medium Risk (1 of 2 - PCV13) 3/11/2017 Influenza Age 5 to Adult 8/1/2018 MEDICARE YEARLY EXAM 8/7/2018 Allergies as of 10/16/2018  Review Complete On: 10/16/2018 By: Serene Pate No Known Allergies Current Immunizations  Never Reviewed No immunizations on file. Not reviewed this visit You Were Diagnosed With   
  
 Codes Comments Closed nondisplaced fracture of fifth metatarsal bone of left foot with routine healing, subsequent encounter    -  Primary ICD-10-CM: Q33.275D ICD-9-CM: V54.19 Left foot pain     ICD-10-CM: E72.790 ICD-9-CM: 729.5 Left ankle pain, unspecified chronicity     ICD-10-CM: M25.572 ICD-9-CM: 719.47 Vitals BP Pulse Temp Resp Height(growth percentile) Weight(growth percentile) 114/57 79 98.1 °F (36.7 °C) (Oral) 16 5' 8\" (1.727 m) 184 lb 6.4 oz (83.6 kg) SpO2 BMI Smoking Status 98% 28.04 kg/m2 Current Every Day Smoker BMI and BSA Data Body Mass Index Body Surface Area 28.04 kg/m 2 2 m 2 Your Updated Medication List  
  
   
This list is accurate as of 10/16/18  2:09 PM.  Always use your most recent med list.  
  
  
  
  
 acetaminophen 325 mg tablet Commonly known as:  TYLENOL Take  by mouth every four (4) hours as needed for Pain. cholecalciferol 50,000 unit capsule Commonly known as:  VITAMIN D3 Take  by mouth every seven (7) days. clonazePAM 1 mg tablet Commonly known as:  Murel Kind Take  by mouth two (2) times a day. COMBIVENT RESPIMAT  mcg/actuation inhaler Generic drug:  ipratropium-albuterol Take 1 Puff by inhalation every six (6) hours. simvastatin 40 mg tablet Commonly known as:  ZOCOR Take  by mouth nightly. Venlafaxine 150 mg Tr24 Take  by mouth. We Performed the Following AMB POC XRAY, FOOT; COMPLETE, 3+ VIEW [66575 CPT(R)] POC XRAY, ANKLE; 2 VIEWS [48109 CPT(R)] Patient Instructions Please follow up in 1 month. You are advised to contact us if your condition worsens. Fifth Metatarsal Cabello Fracture: Care Instructions Your Care Instructions A fifth metatarsal fracture is a break or a thin, hairline crack in the long bone on the outside of the foot. A Cabello fracture occurs near the end of this bone that is closest to the ankle. This type of fracture can happen when a person jumps or changes direction quickly and twists the foot or ankle the wrong way. Or it can happen from repeated stress on the bones of the foot. Treatment depends on how bad the fracture is. You may or may not have had surgery. Your doctor may have put your foot in a cast to keep it stable. A splint may be used in some cases if there is a lot of swelling. You may have been given crutches to use to keep weight off your foot. Your doctor may recommend that you keep weight off the foot for several weeks. The fracture may take 6 weeks to several months to heal. It is important to give your foot time to heal completely so that you don't hurt it again. Do not return to your usual activities until your doctor says you can. Your doctor may suggest that you get physical therapy to help regain strength and range of motion in your foot. You heal best when you take good care of yourself. Eat a variety of healthy foods, and don't smoke. Follow-up care is a key part of your treatment and safety. Be sure to make and go to all appointments, and call your doctor if you are having problems. It's also a good idea to know your test results and keep a list of the medicines you take. How can you care for yourself at home? · Be safe with medicines. Read and follow all instructions on the label. ¨ If the doctor gave you a prescription medicine for pain, take it as prescribed. ¨ If you are not taking a prescription pain medicine, ask your doctor if you can take an over-the-counter medicine. · Follow your doctor's instructions about how much weight you can put on your foot and when you can go back to your usual activities. If you were given crutches, use them as directed. · Put ice or a cold pack on your foot for 10 to 20 minutes at a time. Try to do this every 1 to 2 hours for the next 3 days (when you are awake) or until the swelling goes down. Put a thin cloth between the ice and your skin. · Prop up your foot on a pillow when you ice it or anytime you sit or lie down for the next 3 days. Try to keep it above the level of your heart. This will help reduce swelling. Cast and splint care · If your foot is in a cast or splint, follow the cast or splint care instructions your doctor gives you. If you have a removable fiberglass walking cast or a splint, do not take it off unless your doctor tells you to. · Keep your cast or splint dry. If you have a removable fiberglass walking cast or a splint, ask your doctor if it is okay to remove it to bathe. Your doctor may want you to keep it on as much as possible. · If you are told to keep your cast or splint on, tape a sheet of plastic to cover it when you bathe. Water under the cast or splint can cause your skin to itch and hurt.  
· Never cut your cast or stick anything down inside it to scratch an itch on your leg. When should you call for help? Call 911 anytime you think you may need emergency care. For example, call if: 
  · You have symptoms of a blood clot in your lung (called a pulmonary embolism). These may include: 
¨ Sudden chest pain. ¨ Trouble breathing. ¨ Coughing up blood.  
  · You passed out (lost consciousness).  
 Call your doctor now or seek immediate medical care if: 
  · You have problems with your cast or splint. For example: ¨ The skin under the cast or splint is burning or stinging. ¨ The cast or splint feels too tight. ¨ There is a lot of swelling near the cast or splint. (Some swelling is normal.) ¨ You have a new fever. ¨ There is drainage or a bad smell coming from the cast or splint.  
  · You have increased or severe pain.  
  · You have tingling, weakness, or numbness in your foot and toes.  
  · You cannot move your toes.  
  · Your foot turns cold or changes color.  
 Watch closely for changes in your health, and be sure to contact your doctor if: 
  · The pain does not get better day by day.  
  · You do not get better as expected. Where can you learn more? Go to http://allison-kiki.info/. Enter F779 in the search box to learn more about \"Fifth Metatarsal Cabello Fracture: Care Instructions. \" Current as of: November 29, 2017 Content Version: 11.8 © 5690-8274 Trovebox. Care instructions adapted under license by [employer] (which disclaims liability or warranty for this information). If you have questions about a medical condition or this instruction, always ask your healthcare professional. [address] any warranty or liability for your use of this information. Introducing [facility]! [employer] introduces DvineWave patient portal. Now you can access parts of your medical record, email your doctor's office, and request medication refills online.    
 
1. In your internet browser, go to [email or web address] 2. Click on the First Time User? Click Here link in the Sign In box. You will see the New Member Sign Up page. 3. Enter your ADOPt Access Code exactly as it appears below. You will not need to use this code after youve completed the sign-up process. If you do not sign up before the expiration date, you must request a new code. · ADOPt Access Code: [facility] Expires: 11/5/2018 10:50 AM 
 
4. Enter the last four digits of your Social Security Number (xxxx) and Date of Birth (mm/dd/yyyy) as indicated and click Submit. You will be taken to the next sign-up page. 5. Create a ADOPt ID. This will be your PagaTodo Mobile login ID and cannot be changed, so think of one that is secure and easy to remember. 6. Create a PagaTodo Mobile password. You can change your password at any time. 7. Enter your Password Reset Question and Answer. This can be used at a later time if you forget your password. 8. Enter your e-mail address. You will receive e-mail notification when new information is available in [address] 9. Click Sign Up. You can now view and download portions of your medical record. 10. Click the Download Summary menu link to download a portable copy of your medical information. If you have questions, please visit the Frequently Asked Questions section of the PagaTodo Mobile website. Remember, PagaTodo Mobile is NOT to be used for urgent needs. For medical emergencies, dial 911. Now available from your iPhone and Android! Please provide this summary of care documentation to your next provider. If you have any questions after today's visit, please call [phone].

## 2018-10-16 NOTE — TELEPHONE ENCOUNTER
These are patient instructions per Dr. Nicolas Granados:    1) Continue wearing CAM walker boot as directed. 2) Anticipate providing AS/AC brace at next visit.      Neda Claros PA-C  10/16/2018   4:59 PM

## 2018-10-16 NOTE — PROGRESS NOTES
AMBULATORY PROGRESS NOTE Patient: Tiffany Mckeon             MRN: [id number]     SSN: [id number] Body mass index is 28.04 kg/(m^2). YOB: 1950     AGE: 79 y.o. EX: male PCP: No primary care provider on file. IMPRESSION/DIAGNOSIS AND TREATMENT PLAN  
 
DIAGNOSES 1. Closed nondisplaced fracture of fifth metatarsal bone of left foot with routine healing, subsequent encounter 2. Left foot pain 3. Left ankle pain, unspecified chronicity Orders Placed This Encounter  [22034] Ankle 2V  [80072] Foot Min 3V Tiffany Mckeon understands his diagnoses and the proposed plan. We will see him and get x-rays of his ankle and foot upon his next visit, but upon his next visit, I am going to anticipate putting him in just a regular tennis shoe. He is doing quite well. He reports no tenderness at all and there is no tenderness on examination. Plan: 
 
1) Continue wearing CAM walker boot as directed. 2) Anticipate providing AS/AC brace at next visit. RTO - November 12th, 2018. HPI AND EXAMINATION Tiffany Mckeon IS A 79 y.o. male who presents to my outpatient office for follow up of a closed nondisplaced fracture of the fifth metatarsal bone of the left foot. At last visit, I instructed the patient to continue supplementing with Calcium and Vitamin D and applied a new short fracture walking boot in the office. Since we saw him last, Mr. Hugh Marlow states that he is doing much better since his fracture. He does not report any pain in his distal fibula or his fifth metatarsal at this moment. He arrives in the office today with his CAM walker boot, which he has been wearing as directed. At this time, he will continue wearing his boot and follow up in 1 month. ANKLE/FOOT left 
  
Psychiatry: Alert, Oriented x 3; Speech normal in context and clarity,  
                              Memory intact grossly, no involuntary movements - tremors, no dementia Gait: antalgic Tenderness: mild tenderness ATFL/CFL Regions// on to 5th met base region, Cutaneous: slight AL swelling Joint Motion: WNL Joint /Tendon Stability: Stability Testing: Peroneal tendon instability : not present Instability of ankle not present, 1+ anterior drawer, 2+anterior drawer, gross instability, varus instability,  
                                            Subtalar instability not present Alignment: mild varus Hindfoot, mild Metatarsus Adductus Metatarsus. Neuro Motor/Sensory: NL/NL Vascular: NL foot/ankle pulses Lymphatics: No extremity lymphedema, No calf swelling, no tenderness to calf muscles. CHART REVIEW Past Medical History:  
Diagnosis Date  Anxiety  Bipolar 1 disorder ([facility] [address]  Bronchitis  Chronic obstructive pulmonary disease ([facility] [address]  Hyperlipidemia  Major depression  Metabolic encephalopathy  Schizophrenia ([facility] [address] Current Outpatient Prescriptions Medication Sig  cholecalciferol (VITAMIN D3) 50,000 unit capsule Take  by mouth every seven (7) days.  ipratropium-albuterol (COMBIVENT RESPIMAT)  mcg/actuation inhaler Take 1 Puff by inhalation every six (6) hours.  simvastatin (ZOCOR) 40 mg tablet Take  by mouth nightly.  Venlafaxine 150 mg tr24 Take  by mouth.  acetaminophen (TYLENOL) 325 mg tablet Take  by mouth every four (4) hours as needed for Pain.  clonazePAM (KLONOPIN) 1 mg tablet Take  by mouth two (2) times a day. No current facility-administered medications for this visit. No Known Allergies No past surgical history on file. Social History Occupational History  Not on file. Social History Main Topics  Smoking status: Current Every Day Smoker  Smokeless tobacco: Never Used  Alcohol use No  
 Drug use: No  
 Sexual activity: Not on file No family history on file.  
 
 REVIEW OF SYSTEMS : 10/16/2018  ALL BELOW ARE Negative except : SEE HPI  
 
 Constitutional: Negative for fever, chills and weight loss. Neg Weight Loss Cardiovascular: Negative for chest pain, claudication and leg swelling. SOB, OTT Gastrointestinal/Urological: Negative for  pain, N/V/D/C, Blood in stool or urine,dysuria                         Hematuria, Incontinence, pelvic pain Musculoskeletal: see HPI. Neurological: Negative for dizziness and weakness, headaches,Visual Changes             Confusion,  Or Seizures, Psychiatric/Behavioral: Negative for depression, memory loss and substance abuse. Extremities:  Negative for hair changes, rash or skin lesion changes. Hematologic: Negative for Bleeding problems, bruising, pallor or swollen lymph nodes. Peripheral Vascular: No calf pain, vascular vein tenderness to calf pain No calf throbbing, posterior knee throbbing pain DIAGNOSTIC IMAGING  
 
ANKLE X RAYS 3 VIEWS Left  
10/16/2018 NON WEIGHT BEARING 
 
SOFT TISSUES:        
   
   No soft tissue calcifications,   No Calcified blood vessels Soft tissue swelling location:  
 mild  to fibula region      
 no medial aspect 
  no dorsal midfoot/forefoot OSSEOUS:   
 
Fractures of the lateral malleolus is present. Healing of the fracture/s is/are: improved healing bione to the fibula,   
Fibula: Normal Length Ankle mortise alignment is: :\"Congruent Talar Dome:  No Osteochondral defects seen ,Tibial plafond and talar dome intact Joint Condition: Congruent Bone Spurs No significant bone spurs  Mineralization: suggests Osteopenia I have personally reviewed the images of the above study. The interpretation of this study is my professional opinion Clare Darnell MD10/16/213995:54 AM    
 
   
  
 
 
 FOOT X RAYS 3 VIEWS Left 10/16/2018 NON WEIGHT BEARING RAYS AT [place] [facility] [id number]/16/2018Left FOOT:  
 
 
Bones: Fractures: :Proximal Met base Fx: Metaphyseal region Displacement: Nondisplaced No subluxations or dislocations.  No focal osteolytic or osteoblastic process Bone Spurs:  No significant bone spurs Alignment: \"Normal Midfoot/forefoot Joint:No Significant OA 
OA changes present:  Asa above. Soft Tissues:Normal 
Mineralization: suggests Osteopenia I have personally reviewed the results of the above study. The interpretation of this study is my professional opinion. Please see above section of this report. I have personally reviewed the results of the above study. The interpretation of this study is my professional opinion. Written by Gemma Allen, as dictated by Dr. Dhaval Alvarez. I, Dr. Dhaval Alvarez, confirm that all documentation is accurate.

## 2018-10-16 NOTE — PROCEDURES
ANKLE X RAYS 3 VIEWS Left   10/16/2018    NON WEIGHT BEARING    SOFT TISSUES:                No soft tissue calcifications,   No Calcified blood vessels     Soft tissue swelling location:    mild  to fibula region        no medial aspect    no dorsal midfoot/forefoot     OSSEOUS:      Fractures of the lateral malleolus is present. Healing of the fracture/s is/are: improved healing bione to the fibula,    Fibula: Normal Length  Ankle mortise alignment is: :\"Congruent  Talar Dome:  No Osteochondral defects seen ,Tibial plafond and talar dome intact Joint Condition: Congruent Bone Spurs No significant bone spurs  Mineralization: suggests Osteopenia I have personally reviewed the images of the above study. The interpretation of this study is my professional opinion    . Pito Darnell MD10/16/184906:54 AM                   FOOT X RAYS 3 VIEWS Left 10/16/2018    NON WEIGHT BEARING RAYS AT [place] [facility] [id number]/16/2018Left FOOT:       Bones: Fractures: :Proximal Met base Fx: Metaphyseal region Displacement: Nondisplaced No subluxations or dislocations. No focal osteolytic or osteoblastic process Bone Spurs:  No significant bone spurs Alignment: \"Normal Midfoot/forefoot   Joint:No Significant OA  OA changes present:  Asa above. Soft Tissues:Normal  Mineralization: suggests Osteopenia    I have personally reviewed the results of the above study. The interpretation of this study is my professional opinion.

## 2018-10-16 NOTE — TELEPHONE ENCOUNTER
Tucker Erwin from [employer] called for [employer]. Tucker Erwin is requesting a call back. Said the patient was seen today by [email or web address] [facility] and that the order they have is for the patient to continue doing what he is doing until 10/35374. Tucker Erwin would like to know if the patient needs to continue wearing the boot on the left fib. Tucker Erwin also said that the patient next appointment is on 11/14/18 with . Tucker Erwin would like a call back at tel. [phone] ext/ 241.

## 2018-10-16 NOTE — PATIENT INSTRUCTIONS
Please follow up in 1 month. You are advised to contact us if your condition worsens. Fifth Metatarsal Cabello Fracture: Care Instructions Your Care Instructions A fifth metatarsal fracture is a break or a thin, hairline crack in the long bone on the outside of the foot. A Cabello fracture occurs near the end of this bone that is closest to the ankle. This type of fracture can happen when a person jumps or changes direction quickly and twists the foot or ankle the wrong way. Or it can happen from repeated stress on the bones of the foot. Treatment depends on how bad the fracture is. You may or may not have had surgery. Your doctor may have put your foot in a cast to keep it stable. A splint may be used in some cases if there is a lot of swelling. You may have been given crutches to use to keep weight off your foot. Your doctor may recommend that you keep weight off the foot for several weeks. The fracture may take 6 weeks to several months to heal. It is important to give your foot time to heal completely so that you don't hurt it again. Do not return to your usual activities until your doctor says you can. Your doctor may suggest that you get physical therapy to help regain strength and range of motion in your foot. You heal best when you take good care of yourself. Eat a variety of healthy foods, and don't smoke. Follow-up care is a key part of your treatment and safety. Be sure to make and go to all appointments, and call your doctor if you are having problems. It's also a good idea to know your test results and keep a list of the medicines you take. How can you care for yourself at home? · Be safe with medicines. Read and follow all instructions on the label. ¨ If the doctor gave you a prescription medicine for pain, take it as prescribed. ¨ If you are not taking a prescription pain medicine, ask your doctor if you can take an over-the-counter medicine. · Follow your doctor's instructions about how much weight you can put on your foot and when you can go back to your usual activities. If you were given crutches, use them as directed. · Put ice or a cold pack on your foot for 10 to 20 minutes at a time. Try to do this every 1 to 2 hours for the next 3 days (when you are awake) or until the swelling goes down. Put a thin cloth between the ice and your skin. · Prop up your foot on a pillow when you ice it or anytime you sit or lie down for the next 3 days. Try to keep it above the level of your heart. This will help reduce swelling. Cast and splint care · If your foot is in a cast or splint, follow the cast or splint care instructions your doctor gives you. If you have a removable fiberglass walking cast or a splint, do not take it off unless your doctor tells you to. · Keep your cast or splint dry. If you have a removable fiberglass walking cast or a splint, ask your doctor if it is okay to remove it to bathe. Your doctor may want you to keep it on as much as possible. · If you are told to keep your cast or splint on, tape a sheet of plastic to cover it when you bathe. Water under the cast or splint can cause your skin to itch and hurt. · Never cut your cast or stick anything down inside it to scratch an itch on your leg. When should you call for help? Call 911 anytime you think you may need emergency care. For example, call if: 
  · You have symptoms of a blood clot in your lung (called a pulmonary embolism). These may include: 
¨ Sudden chest pain. ¨ Trouble breathing. ¨ Coughing up blood.  
  · You passed out (lost consciousness).  
 Call your doctor now or seek immediate medical care if: 
  · You have problems with your cast or splint. For example: ¨ The skin under the cast or splint is burning or stinging. ¨ The cast or splint feels too tight. ¨ There is a lot of swelling near the cast or splint. (Some swelling is normal.) ¨ You have a new fever. ¨ There is drainage or a bad smell coming from the cast or splint.  
  · You have increased or severe pain.  
  · You have tingling, weakness, or numbness in your foot and toes.  
  · You cannot move your toes.  
  · Your foot turns cold or changes color.  
 Watch closely for changes in your health, and be sure to contact your doctor if: 
  · The pain does not get better day by day.  
  · You do not get better as expected. Where can you learn more? Go to http://allison-kiki.info/. Enter C182 in the search box to learn more about \"Fifth Metatarsal Cabello Fracture: Care Instructions. \" Current as of: November 29, 2017 Content Version: 11.8 © 4225-3482 [employer]. Care instructions adapted under license by [employer] (which disclaims liability or warranty for this information). If you have questions about a medical condition or this instruction, always ask your healthcare professional. [address] any warranty or liability for your use of this information.

## 2018-10-17 NOTE — TELEPHONE ENCOUNTER
Spoke with [facility], she verbalized understanding that Mr. Elan Mast will continue CAM boot until follow up appointment.

## 2018-10-17 NOTE — TELEPHONE ENCOUNTER
Tried calling, phone rang a few times and then was disconnected. Tried calling a second time and was not able to connect call-phone did not ring.

## 2018-11-26 ENCOUNTER — OFFICE VISIT (OUTPATIENT)
Dept: ORTHOPEDIC SURGERY | Age: 68
End: 2018-11-26

## 2018-11-26 VITALS
OXYGEN SATURATION: 95 % | SYSTOLIC BLOOD PRESSURE: 130 MMHG | HEIGHT: 68 IN | HEART RATE: 81 BPM | BODY MASS INDEX: 28.28 KG/M2 | DIASTOLIC BLOOD PRESSURE: 89 MMHG | TEMPERATURE: 97.7 F | RESPIRATION RATE: 16 BRPM | WEIGHT: 186.6 LBS

## 2018-11-26 DIAGNOSIS — M79.672 LEFT FOOT PAIN: Primary | ICD-10-CM

## 2018-11-26 DIAGNOSIS — S92.355D CLOSED NONDISPLACED FRACTURE OF FIFTH METATARSAL BONE OF LEFT FOOT WITH ROUTINE HEALING, SUBSEQUENT ENCOUNTER: ICD-10-CM

## 2018-11-26 DIAGNOSIS — S82.822G CLOSED TORUS FRACTURE OF DISTAL END OF LEFT FIBULA WITH DELAYED HEALING, SUBSEQUENT ENCOUNTER: ICD-10-CM

## 2018-11-26 DIAGNOSIS — M25.572 ACUTE LEFT ANKLE PAIN: ICD-10-CM

## 2018-11-26 NOTE — PROCEDURES
X-rays, 5 views of the left foot/ankle reveals continued healing to distal fibula fracture and fifth metatarsal fractures. No new fractures noted. Diffuse degenerative changes present.

## 2018-11-26 NOTE — PROGRESS NOTES
AMBULATORY PROGRESS NOTE Patient: Milton Barrett             MRN: [id number]     SSN: [id number] There is no height or weight on file to calculate BMI. YOB: 1950     AGE: 79 y.o. EX: male PCP: No primary care provider on file. IMPRESSION/DIAGNOSIS AND TREATMENT PLAN  
 
DIAGNOSES No diagnosis found. No orders of the defined types were placed in this encounter. Milton Barrett understands his diagnoses and the proposed plan. Plan: 
 
1) 
2) RTO - 
 
 HPI AND EXAMINATION Milton Barrett IS A 79 y.o. male who presents to my outpatient office for follow up of a closed nondisplaced fracture of the fifth metatarsal bone of the left foot. At the last visit, I instructed the patient to continue activity modification as directed and to continue wearing the CAM walker boot. Since we saw him last, *** There were no vitals taken for this visit. ANKLE/FOOT left 
  
Psychiatry: Alert, Oriented x 3; Speech normal in context and clarity,  
                              Memory intact grossly, no involuntary movements - tremors, no dementia Gait: antalgic Tenderness: mild tenderness ATFL/CFL Regions// on to 5th met base region, Cutaneous: slight AL swelling Joint Motion: WNL Joint /Tendon Stability: Stability Testing: Peroneal tendon instability : not present Instability of ankle not present, 1+ anterior drawer, 2+anterior drawer, gross instability, varus instability,  
                                            Subtalar instability not present Alignment: mild varus Hindfoot, mild Metatarsus Adductus Metatarsus. Neuro Motor/Sensory: NL/NL Vascular: NL foot/ankle pulses Lymphatics: No extremity lymphedema, No calf swelling, no tenderness to calf muscles. CHART REVIEW Past Medical History:  
Diagnosis Date  Anxiety  Bipolar 1 disorder ([facility] [address]  Bronchitis  Chronic obstructive pulmonary disease ([facility] [address]  Hyperlipidemia  Major depression  Metabolic encephalopathy  Schizophrenia ([facility] [address] Current Outpatient Medications Medication Sig  
 acetaminophen (TYLENOL) 325 mg tablet Take  by mouth every four (4) hours as needed for Pain.  cholecalciferol (VITAMIN D3) 50,000 unit capsule Take  by mouth every seven (7) days.  clonazePAM (KLONOPIN) 1 mg tablet Take  by mouth two (2) times a day.  ipratropium-albuterol (COMBIVENT RESPIMAT)  mcg/actuation inhaler Take 1 Puff by inhalation every six (6) hours.  simvastatin (ZOCOR) 40 mg tablet Take  by mouth nightly.  Venlafaxine 150 mg tr24 Take  by mouth. No current facility-administered medications for this visit. No Known Allergies No past surgical history on file. Social History Occupational History  Not on file Tobacco Use  Smoking status: Current Every Day Smoker  Smokeless tobacco: Never Used Substance and Sexual Activity  Alcohol use: No  
 Drug use: No  
 Sexual activity: Not on file No family history on file. REVIEW OF SYSTEMS : 11/26/2018  ALL BELOW ARE Negative except : SEE HPI Constitutional: Negative for fever, chills and weight loss. Neg Weight Loss Cardiovascular: Negative for chest pain, claudication and leg swelling. SOB, OTT Gastrointestinal/Urological: Negative for  pain, N/V/D/C, Blood in stool or urine,dysuria                         Hematuria, Incontinence, pelvic pain Musculoskeletal: see HPI. Neurological: Negative for dizziness and weakness, headaches,Visual Changes             Confusion,  Or Seizures, Psychiatric/Behavioral: Negative for depression, memory loss and substance abuse. Extremities:  Negative for hair changes, rash or skin lesion changes. Hematologic: Negative for Bleeding problems, bruising, pallor or swollen lymph nodes. Peripheral Vascular: No calf pain, vascular vein tenderness to calf pain No calf throbbing, posterior knee throbbing pain  DIAGNOSTIC IMAGING  
 
 No notes on file Please see above section of this report. I have personally reviewed the results of the above study. The interpretation of this study is my professional opinion. Written by Janine Quezada, as dictated by Dr. Suzzanne Bernheim. I, Dr. Suzzanne Bernheim, confirm that all documentation is accurate.

## 2018-11-26 NOTE — PATIENT INSTRUCTIONS
Wean CAM boot to supportive shoe/sneaker using ASA brace Continue activity modification Perform gentle ROM exercises and Physical Therapy Discontinue ACE bandage, use Tubigrip provided in the office Follow up in 6 weeks or sooner as needed Ankle Fracture: Rehab Exercises Your Care Instructions Here are some examples of typical rehabilitation exercises for your condition. Start each exercise slowly. Ease off the exercise if you start to have pain. Your doctor or physical therapist will tell you when you can start these exercises and which ones will work best for you. How to do the exercises Calf stretch (knee straight) 1. Sit with your affected leg straight and supported on the floor. Your other leg should be bent, with that foot flat on the floor. 2. Place a towel around your affected foot just under the toes. 3. Hold one end of the towel in each hand, with your hands above your knees. 4. Pull back gently with the towel so that your foot stretches toward you. 5. Hold the position for at least 15 to 30 seconds. 6. Repeat 2 to 4 times a session, up to 5 sessions a day. Calf stretch (knee bent) 1. Sit with your affected leg straight and supported on the floor. Your other leg should be bent, with that foot flat on the floor. 2. Place a pillow or foam roll under your affected leg. 3. Place a towel around your affected foot just under the toes. 4. Hold one end of the towel in each hand, with your hands above your knees. 5. Pull back gently with the towel so that your foot stretches toward you. 6. Hold the position for at least 15 to 30 seconds. 7. Repeat 2 to 4 times a session, up to 5 sessions a day. Ankle plantar flexion 1. Sit with your affected leg straight and supported on the floor. Your other leg should be bent, with that foot flat on the floor.  
2. Keeping your affected leg straight, gently flex your foot downward so your toes are pointed away from your body. Then slowly relax your foot to the starting position. 3. Repeat 8 to 12 times. Ankle dorsiflexion 1. Sit with your affected leg straight and supported on the floor. Your other leg should be bent, with that foot flat on the floor. 2. Keeping your affected leg straight, gently flex your foot back toward your body so your toes point upward. Then slowly relax your foot to the starting position. 3. Repeat 8 to 12 times. Resisted ankle plantar flexion 1. Sit with your affected leg straight and supported on the floor. Your other leg should be bent, with that foot flat on the floor. 2. Place an elastic band around your affected foot just under the toes. 3. Hold each end of the band in each hand, with your hands above your knees. 4. Keeping your affected leg straight, gently flex your foot downward so your toes are pointed away from your body. Then slowly relax your foot to the starting position. 5. Repeat 8 to 12 times. Resisted ankle dorsiflexion 1. Tie the ends of an exercise band together to form a loop. Attach one end of the loop to a secure object, like a table leg, or shut a door on it to hold it in place. (Or you can have someone hold one end of the loop to provide resistance.) 2. While sitting on the floor or in a chair, loop the other end of the band over the top of your affected foot. 3. Keeping your knee and leg straight, slowly flex your foot toward you to pull back on the exercise band, and then slowly relax. 4. Repeat 8 to 12 times. Resisted ankle inversion 1. Sit on the floor with your good leg crossed over your other leg. 2. Hold both ends of an exercise band and loop the band around the inside of your affected foot. Then press your good foot against the band. 3. Keeping your legs crossed, slowly push your affected foot against the band so that foot moves away from your good foot. Then slowly relax. 4. Repeat 8 to 12 times. Resisted ankle eversion 1. Sit on the floor with your legs straight. 2. Hold both ends of an exercise band and loop the band around the outside of your affected foot. Then press your good foot against the band. 3. Keeping your leg straight, slowly push your affected foot outward against the band and away from your good foot without letting your leg rotate. Then slowly relax. 4. Repeat 8 to 12 times. Ankle alphabet 1. Sit in a chair with your feet flat on the floor. (You can also do this exercise lying on your back with your affected leg propped up on a pillow). 2. Lift the heel of your affected foot off the floor, and slowly trace the letters of the alphabet. Heel raises 1. Stand with your feet a few inches apart, with your hands lightly resting on a counter or chair in front of you. 2. Slowly raise your heels off the floor while keeping your knees straight. 3. Hold for about 6 seconds, then slowly lower your heels to the floor. 4. Do 8 to 12 repetitions several times during the day. Follow-up care is a key part of your treatment and safety. Be sure to make and go to all appointments, and call your doctor if you are having problems. It's also a good idea to know your test results and keep a list of the medicines you take. Where can you learn more? Go to http://allison-kiki.info/. Enter T800 in the search box to learn more about \"Ankle Fracture: Rehab Exercises. \" Current as of: November 29, 2017 Content Version: 11.8 © 8183-6612 [employer]. Care instructions adapted under license by [employer] (which disclaims liability or warranty for this information). If you have questions about a medical condition or this instruction, always ask your healthcare professional. [address] any warranty or liability for your use of this information.

## 2018-11-26 NOTE — PROGRESS NOTES
Patient: Cleveland Anna                     MRN: [id number]       SSN: [id number] YOB: 1950              AGE: 79 y.o. SEX: male PCP:No primary care provider on file. Chief Complaint:  
Chief Complaint Patient presents with  Leg Pain LEFT LEG PAIN, F/U APPT. HPI:  
 
Cleveland Anna is a 79 y.o. male who presents today for evaluation of left ankle fracture and left fifth metatarsal fracture. Patient was last seen in the office by Dr. Marbella Cuello. The patient's prior history is detailed in the previous encounter. At the last visit, the patient's plan was as follows: 
 
 
Plan: 
1) Continue wearing CAM walker boot as directed. 2) Anticipate providing AS/AC brace at next visit. 3) RTO - November 12th, 2018 - repeat x-rays of his ankle and foot Since we saw him last, Mr. Eder Gonzales states that he is doing much better since his fracture. He does not report any pain in his distal fibula or his fifth metatarsal at this moment. He arrives in the office today with his CAM walker boot, which he has been wearing as directed. At this time, he will discontinue the CAM boot and start wearing a supportive sneaker or shoe. PHYSICAL EXAM:  
 
Visit Vitals /89 Pulse 81 Temp 97.7 °F (36.5 °C) (Oral) Resp 16 Ht 5' 8\" (1.727 m) Wt 186 lb 9.6 oz (84.6 kg) SpO2 95% BMI 28.37 kg/m² Pain Scale: 0 - No pain/10 GEN:  Alert, well developed, well nourished, well appearing 79 y.o. male in no acute distress. PSYCH:  Normal affect, mood, and conduct. alert, oriented x 3 alert, oriented x 3, no dementia M/S EXAMINATION OF: ANKLE/FOOT left Gait: antalgic Tenderness: no tenderness to ATFL/CFL Regions// on to 5th met base region, Cutaneous: no swelling Joint Motion: WNL Joint /Tendon Stability: Stability Testing: Peroneal tendon instability : not present Instability of ankle not present, 1+ anterior drawer, 2+anterior drawer, gross instability, varus instability,  
                                            Subtalar instability not present Alignment: mild varus Hindfoot, mild Metatarsus Adductus Metatarsus. Neuro Motor/Sensory: NL/NL Vascular: NL foot/ankle pulses Lymphatics: No extremity lymphedema, No calf swelling, no tenderness to calf muscles. IMPRESSION:  
 
1. Left foot pain 2. Acute left ankle pain 3. Closed nondisplaced fracture of fifth metatarsal bone of left foot with routine healing, subsequent encounter 4. Closed torus fracture of distal end of left fibula with delayed healing, subsequent encounter PLAN:  
 
 
 
Orders Placed This Encounter  Generic Supply Order  [46732] Foot Min 3V  [62487] Ankle 2V  
  
  
 
 
 
Wean CAM boot to supportive shoe/sneaker using [facility] brace Continue activity modification Perform gentle ROM exercises and Physical Therapy Discontinue ACE bandage, use Tubigrip provided in the office Follow up in 6 weeks or sooner as needed  
  
 
 
Patient has been discussed with Dr. Emmy Ortega during this visit and he agrees with the assessment and plan. Patient understands treatment plan and has been provided with patient education. PAST MEDICAL HISTORY:  
 
Past Medical History:  
Diagnosis Date  Anxiety  Bipolar 1 disorder ([facility] [address]  Bronchitis  Chronic obstructive pulmonary disease ([facility] [address]  Hyperlipidemia  Major depression  Metabolic encephalopathy  Schizophrenia ([facility] [address] MEDICATIONS:  
 
Current Outpatient Medications Medication Sig  
 acetaminophen (TYLENOL) 325 mg tablet Take  by mouth every four (4) hours as needed for Pain.  cholecalciferol (VITAMIN D3) 50,000 unit capsule Take  by mouth every seven (7) days.  clonazePAM (KLONOPIN) 1 mg tablet Take  by mouth two (2) times a day.  ipratropium-albuterol (COMBIVENT RESPIMAT)  mcg/actuation inhaler Take 1 Puff by inhalation every six (6) hours.  simvastatin (ZOCOR) 40 mg tablet Take  by mouth nightly.  Venlafaxine 150 mg tr24 Take  by mouth. No current facility-administered medications for this visit. ALLERGIES:  
 
No Known Allergies PAST SURGICAL HISTORY:  
 
History reviewed. No pertinent surgical history. SOCIAL HISTORY:  
 
Social History Socioeconomic History  Marital status: UNKNOWN Spouse name: Not on file  Number of children: Not on file  Years of education: Not on file  Highest education level: Not on file Social Needs  Financial resource strain: Not on file  Food insecurity - worry: Not on file  Food insecurity - inability: Not on file  Transportation needs - medical: Not on file  Transportation needs - non-medical: Not on file Occupational History  Not on file Tobacco Use  Smoking status: Current Every Day Smoker  Smokeless tobacco: Never Used Substance and Sexual Activity  Alcohol use: No  
 Drug use: No  
 Sexual activity: Not on file Other Topics Concern  Not on file Social History Narrative  Not on file FAMILY HISTORY:  
 
History reviewed. No pertinent family history. REVIEW OF SYSTEMS:  
 
Otherwise as noted in HPI 
 
 
RADIOGRAPHS & DIAGNOSTIC STUDIES Results for orders placed or performed in visit on 11/26/18 AMB POC XRAY, FOOT; COMPLETE, 3+ VIEW Narrative Jamaal Campos PA-C     11/26/2018  4:43 PM 
X-rays, 5 views of the left foot/ankle reveals continued healing  
to distal fibula fracture and fifth metatarsal fractures. No new  
fractures noted. Diffuse degenerative changes present. POC XRAY, ANKLE; 2 VIEWS Narrative Jamaal Campos PA-C     11/26/2018  4:43 PM 
X-rays, 5 views of the left foot/ankle reveals continued healing  
to distal fibula fracture and fifth metatarsal fractures. No new  
fractures noted. Diffuse degenerative changes present. Renea Gilbert PA-C 
11/26/2018

## 2018-11-28 ENCOUNTER — TELEPHONE (OUTPATIENT)
Dept: ORTHOPEDIC SURGERY | Age: 68
End: 2018-11-28

## 2018-11-28 NOTE — TELEPHONE ENCOUNTER
Liseth Barker  Therapist working with Mr. Dara Lott called ref the orders. Liseth Barker is asking for order for progressive strengthening in addition to the previous orders. Please fax new order to fax [phone].   Liseth Barker can be reached at [address] [facility] or on her cell [phone]

## 2018-11-29 DIAGNOSIS — M25.572 ACUTE LEFT ANKLE PAIN: Primary | ICD-10-CM

## 2018-12-17 ENCOUNTER — HOSPITAL ENCOUNTER (EMERGENCY)
Age: 68
Discharge: HOME | End: 2018-12-18
Attending: EMERGENCY MEDICINE
Payer: MEDICARE

## 2018-12-17 DIAGNOSIS — F69 BEHAVIOR PROBLEM, ADULT: Primary | ICD-10-CM

## 2018-12-17 PROCEDURE — 99284 EMERGENCY DEPT VISIT MOD MDM: CPT

## 2018-12-17 PROCEDURE — 80053 COMPREHEN METABOLIC PANEL: CPT

## 2018-12-17 PROCEDURE — 85025 COMPLETE CBC W/AUTO DIFF WBC: CPT

## 2018-12-18 VITALS
SYSTOLIC BLOOD PRESSURE: 157 MMHG | OXYGEN SATURATION: 98 % | RESPIRATION RATE: 16 BRPM | HEART RATE: 94 BPM | DIASTOLIC BLOOD PRESSURE: 78 MMHG | TEMPERATURE: 97 F

## 2018-12-18 LAB
ALBUMIN SERPL-MCNC: 3.5 G/DL (ref 3.4–5)
ALBUMIN/GLOB SERPL: 1 {RATIO} (ref 0.8–1.7)
ALP SERPL-CCNC: 118 U/L (ref 45–117)
ALT SERPL-CCNC: 33 U/L (ref 16–61)
AMPHET UR QL SCN: NEGATIVE
ANION GAP SERPL CALC-SCNC: 9 MMOL/L (ref 3–18)
APPEARANCE UR: CLEAR
AST SERPL-CCNC: 22 U/L (ref 15–37)
BARBITURATES UR QL SCN: NEGATIVE
BASOPHILS # BLD: 0.2 K/UL (ref 0–0.1)
BASOPHILS NFR BLD: 1 % (ref 0–2)
BENZODIAZ UR QL: NEGATIVE
BILIRUB SERPL-MCNC: 0.2 MG/DL (ref 0.2–1)
BILIRUB UR QL: NEGATIVE
BUN SERPL-MCNC: 7 MG/DL (ref 7–18)
BUN/CREAT SERPL: 8 (ref 12–20)
CALCIUM SERPL-MCNC: 8.6 MG/DL (ref 8.5–10.1)
CANNABINOIDS UR QL SCN: NEGATIVE
CHLORIDE SERPL-SCNC: 109 MMOL/L (ref 100–108)
CO2 SERPL-SCNC: 24 MMOL/L (ref 21–32)
COCAINE UR QL SCN: NEGATIVE
COLOR UR: YELLOW
CREAT SERPL-MCNC: 0.87 MG/DL (ref 0.6–1.3)
DIFFERENTIAL METHOD BLD: ABNORMAL
EOSINOPHIL # BLD: 0.6 K/UL (ref 0–0.4)
EOSINOPHIL NFR BLD: 4 % (ref 0–5)
ERYTHROCYTE [DISTWIDTH] IN BLOOD BY AUTOMATED COUNT: 14.3 % (ref 11.6–14.5)
GLOBULIN SER CALC-MCNC: 3.4 G/DL (ref 2–4)
GLUCOSE SERPL-MCNC: 125 MG/DL (ref 74–99)
GLUCOSE UR STRIP.AUTO-MCNC: NEGATIVE MG/DL
HCT VFR BLD AUTO: 41.3 % (ref 36–48)
HDSCOM,HDSCOM: NORMAL
HGB BLD-MCNC: 14 G/DL (ref 13–16)
HGB UR QL STRIP: NEGATIVE
KETONES UR QL STRIP.AUTO: NEGATIVE MG/DL
LEUKOCYTE ESTERASE UR QL STRIP.AUTO: NEGATIVE
LYMPHOCYTES # BLD: 4 K/UL (ref 0.9–3.6)
LYMPHOCYTES NFR BLD: 26 % (ref 21–52)
MCH RBC QN AUTO: 32 PG (ref 24–34)
MCHC RBC AUTO-ENTMCNC: 33.9 G/DL (ref 31–37)
MCV RBC AUTO: 94.3 FL (ref 74–97)
METHADONE UR QL: NEGATIVE
MONOCYTES # BLD: 1.2 K/UL (ref 0.05–1.2)
MONOCYTES NFR BLD: 8 % (ref 3–10)
NEUTS SEG # BLD: 9.5 K/UL (ref 1.8–8)
NEUTS SEG NFR BLD: 61 % (ref 40–73)
NITRITE UR QL STRIP.AUTO: NEGATIVE
OPIATES UR QL: NEGATIVE
PCP UR QL: NEGATIVE
PH UR STRIP: 5.5 [PH] (ref 5–8)
PLATELET # BLD AUTO: 242 K/UL (ref 135–420)
PLATELET COMMENTS,PCOM: ABNORMAL
PMV BLD AUTO: 11.3 FL (ref 9.2–11.8)
POTASSIUM SERPL-SCNC: 3.5 MMOL/L (ref 3.5–5.5)
PROT SERPL-MCNC: 6.9 G/DL (ref 6.4–8.2)
PROT UR STRIP-MCNC: NEGATIVE MG/DL
RBC # BLD AUTO: 4.38 M/UL (ref 4.7–5.5)
RBC MORPH BLD: ABNORMAL
SODIUM SERPL-SCNC: 142 MMOL/L (ref 136–145)
SP GR UR REFRACTOMETRY: 1 (ref 1–1.03)
UROBILINOGEN UR QL STRIP.AUTO: 0.2 EU/DL (ref 0.2–1)
WBC # BLD AUTO: 15.5 K/UL (ref 4.6–13.2)

## 2018-12-18 PROCEDURE — 80307 DRUG TEST PRSMV CHEM ANLYZR: CPT

## 2018-12-18 PROCEDURE — 81003 URINALYSIS AUTO W/O SCOPE: CPT

## 2018-12-18 NOTE — ED PROVIDER NOTES
EMERGENCY DEPARTMENT HISTORY AND PHYSICAL EXAM    Date: 12/17/2018  Patient Name: Nevin Coley    History of Presenting Illness     Chief Complaint   Patient presents with    Mental Health Problem         History Provided By: Patient    Chief Complaint: AMS  Duration: 1 Days  Timing:  Acute  Location:   Quality:   Severity:   Modifying Factors: na  Associated Symptoms: denies any other associated signs or symptoms      Additional History (Context): Nevin Coley is a 79 y.o. male with history of  psych disorder who presents to the ED with a complaint of AMS x1 day. Per EMS pt has not been himself at the home he stays. Staff at the home would like him evaluated for possible UTI. Pt states he has no complaints and feels well only that others have been 'pissing him off'. He denies alcohol or drug use. He denies SI or HI.       PCP: None    Current Outpatient Medications   Medication Sig Dispense Refill    acetaminophen (TYLENOL) 325 mg tablet Take  by mouth every four (4) hours as needed for Pain.  cholecalciferol (VITAMIN D3) 50,000 unit capsule Take  by mouth every seven (7) days.  clonazePAM (KLONOPIN) 1 mg tablet Take  by mouth two (2) times a day.  ipratropium-albuterol (COMBIVENT RESPIMAT)  mcg/actuation inhaler Take 1 Puff by inhalation every six (6) hours.  simvastatin (ZOCOR) 40 mg tablet Take  by mouth nightly.  Venlafaxine 150 mg tr24 Take  by mouth. Past History     Past Medical History:  Past Medical History:   Diagnosis Date    Anxiety     B[facility] 1 disorder ([facility] [address]     Bronchitis     Chronic obstructive pulmonary disease ([facility] [address]     Hyperlipidemia     Major depression     Metabolic encephalopathy     Schizophrenia ([facility] [address]        Past Surgical History:  History reviewed. No pertinent surgical history. Family History:  History reviewed. No pertinent family history.     Social History:  Social History     Tobacco Use    Smoking status: Current Every Day Smoker  Smokeless tobacco: Never Used   Substance Use Topics    Alcohol use: No    Drug use: No       Allergies:  No Known Allergies      Review of Systems   Review of Systems   Constitutional: Negative for fatigue and fever. HENT: Negative for congestion. Eyes: Negative for pain. Respiratory: Negative for cough and shortness of breath. Cardiovascular: Negative for chest pain. Gastrointestinal: Negative for abdominal pain, diarrhea and vomiting. Genitourinary: Negative for difficulty urinating. Musculoskeletal: Negative for back pain. Skin: Negative for wound. Neurological: Negative for dizziness and numbness. Psychiatric/Behavioral: Positive for agitation. All other systems reviewed and are negative. All Other Systems Negative  Physical Exam     Vitals:    12/18/18 0300   Temp: 97.2 °F (36.2 °C)   SpO2: 97%     Physical Exam   Constitutional: He is oriented to person, place, and time. He appears well-developed and well-nourished. HENT:   Head: Normocephalic and atraumatic. Nose: Nose normal.   Eyes: Conjunctivae are normal.   Neck: Normal range of motion. Cardiovascular: Normal rate, regular rhythm and normal heart sounds. Pulmonary/Chest: Effort normal and breath sounds normal. No respiratory distress. He has no wheezes. Abdominal: Soft. He exhibits no distension. There is no tenderness. There is no rebound. Musculoskeletal: Normal range of motion. Neurological: He is alert and oriented to person, place, and time. Skin: Skin is warm. Rash noted. Erythematous scaly rash to face   Psychiatric: His speech is normal. Thought content normal. He is aggressive. He expresses no homicidal and no suicidal ideation. He expresses no suicidal plans and no homicidal plans. Vitals reviewed.            Diagnostic Study Results     Labs -     Recent Results (from the past 12 hour(s))   CBC WITH AUTOMATED DIFF    Collection Time: 12/17/18 11:48 PM   Result Value Ref Range    WBC 15.5 (H) 4.6 - 13.2 K/uL    RBC 4.38 (L) 4.70 - 5.50 M/uL    HGB 14.0 13.0 - 16.0 g/dL    HCT 41.3 36.0 - 48.0 %    MCV 94.3 74.0 - 97.0 FL    MCH 32.0 24.0 - 34.0 PG    MCHC 33.9 31.0 - 37.0 g/dL    RDW 14.3 11.6 - 14.5 %    PLATELET 105 170 - 307 K/uL    MPV 11.3 9.2 - 11.8 FL    NEUTROPHILS 61 40 - 73 %    LYMPHOCYTES 26 21 - 52 %    MONOCYTES 8 3 - 10 %    EOSINOPHILS 4 0 - 5 %    BASOPHILS 1 0 - 2 %    ABS. NEUTROPHILS 9.5 (H) 1.8 - 8.0 K/UL    ABS. LYMPHOCYTES 4.0 (H) 0.9 - 3.6 K/UL    ABS. MONOCYTES 1.2 0.05 - 1.2 K/UL    ABS. EOSINOPHILS 0.6 (H) 0.0 - 0.4 K/UL    ABS. BASOPHILS 0.2 (H) 0.0 - 0.1 K/UL    DF AUTOMATED      PLATELET COMMENTS ADEQUATE PLATELETS      RBC COMMENTS NORMOCYTIC, NORMOCHROMIC     METABOLIC PANEL, COMPREHENSIVE    Collection Time: 12/17/18 11:48 PM   Result Value Ref Range    Sodium 142 136 - 145 mmol/L    Potassium 3.5 3.5 - 5.5 mmol/L    Chloride 109 (H) 100 - 108 mmol/L    CO2 24 21 - 32 mmol/L    Anion gap 9 3.0 - 18 mmol/L    Glucose 125 (H) 74 - 99 mg/dL    BUN 7 7.0 - 18 MG/DL    Creatinine 0.87 0.6 - 1.3 MG/DL    BUN/Creatinine ratio 8 (L) 12 - 20      GFR est AA >60 >60 ml/min/1.73m2    GFR est non-AA >60 >60 ml/min/1.73m2    Calcium 8.6 8.5 - 10.1 MG/DL    Bilirubin, total 0.2 0.2 - 1.0 MG/DL    ALT (SGPT) 33 16 - 61 U/L    AST (SGOT) 22 15 - 37 U/L    Alk. phosphatase 118 (H) 45 - 117 U/L    Protein, total 6.9 6.4 - 8.2 g/dL    Albumin 3.5 3.4 - 5.0 g/dL    Globulin 3.4 2.0 - 4.0 g/dL    A-G Ratio 1.0 0.8 - 1.7         Radiologic Studies -   No orders to display     CT Results  (Last 48 hours)    None        CXR Results  (Last 48 hours)    None            Medical Decision Making   I am the first provider for this patient. I reviewed the vital signs, available nursing notes, past medical history, past surgical history, family history and social history. Vital Signs-Reviewed the patient's vital signs. Pulse Oximetry Analysis - 97% on RA      Records Reviewed:  Old Medical Records    Procedures:  Procedures    Provider Notes (Medical Decision Making):     Pt sent to re evaluated for UTI due to being altered at his facility today. HE denied HI/SI      Labs Reviewed   CBC WITH AUTOMATED DIFF - Abnormal; Notable for the following components:       Result Value    WBC 15.5 (*)     RBC 4.38 (*)     ABS. NEUTROPHILS 9.5 (*)     ABS. LYMPHOCYTES 4.0 (*)     ABS. EOSINOPHILS 0.6 (*)     ABS. BASOPHILS 0.2 (*)     All other components within normal limits   METABOLIC PANEL, COMPREHENSIVE - Abnormal; Notable for the following components:    Chloride 109 (*)     Glucose 125 (*)     BUN/Creatinine ratio 8 (*)     Alk. phosphatase 118 (*)     All other components within normal limits   URINALYSIS W/ RFLX MICROSCOPIC   DRUG SCREEN, URINE     Bump in WBC with otherwise stable lab values. Pt unable or unwilling to provide urine sample. Attempted straight stick with no success. Discussed pt with Dr Viola Greenberg who will assume care of this patient at this time. MED RECONCILIATION:  No current facility-administered medications for this encounter. Current Outpatient Medications   Medication Sig    acetaminophen (TYLENOL) 325 mg tablet Take  by mouth every four (4) hours as needed for Pain.  cholecalciferol (VITAMIN D3) 50,000 unit capsule Take  by mouth every seven (7) days.  clonazePAM (KLONOPIN) 1 mg tablet Take  by mouth two (2) times a day.  ipratropium-albuterol (COMBIVENT RESPIMAT)  mcg/actuation inhaler Take 1 Puff by inhalation every six (6) hours.  simvastatin (ZOCOR) 40 mg tablet Take  by mouth nightly.  Venlafaxine 150 mg tr24 Take  by mouth. Follow-up Information    None         Current Discharge Medication List              Diagnosis     Clinical Impression:   1.  Altered mental status, unspecified altered mental status type

## 2023-02-01 RX ORDER — ATORVASTATIN CALCIUM 40 MG/1
40 TABLET, FILM COATED ORAL DAILY
COMMUNITY

## 2023-02-01 RX ORDER — ALBUTEROL SULFATE 90 UG/1
2 AEROSOL, METERED RESPIRATORY (INHALATION) EVERY 6 HOURS PRN
COMMUNITY

## 2023-02-01 RX ORDER — RISPERIDONE 2 MG/1
2 TABLET ORAL
COMMUNITY